# Patient Record
Sex: FEMALE | Race: OTHER | Employment: FULL TIME | URBAN - METROPOLITAN AREA
[De-identification: names, ages, dates, MRNs, and addresses within clinical notes are randomized per-mention and may not be internally consistent; named-entity substitution may affect disease eponyms.]

---

## 2021-03-19 ENCOUNTER — OFFICE VISIT (OUTPATIENT)
Dept: SURGERY | Facility: CLINIC | Age: 46
End: 2021-03-19
Payer: COMMERCIAL

## 2021-03-19 ENCOUNTER — NURSE NAVIGATOR ENCOUNTER (OUTPATIENT)
Dept: HEMATOLOGY/ONCOLOGY | Facility: HOSPITAL | Age: 46
End: 2021-03-19

## 2021-03-19 VITALS
WEIGHT: 144 LBS | SYSTOLIC BLOOD PRESSURE: 162 MMHG | BODY MASS INDEX: 23.14 KG/M2 | OXYGEN SATURATION: 99 % | HEART RATE: 101 BPM | DIASTOLIC BLOOD PRESSURE: 104 MMHG | RESPIRATION RATE: 16 BRPM | HEIGHT: 66 IN

## 2021-03-19 DIAGNOSIS — C50.511 MALIGNANT NEOPLASM OF LOWER-OUTER QUADRANT OF RIGHT BREAST OF FEMALE, ESTROGEN RECEPTOR POSITIVE (HCC): Primary | ICD-10-CM

## 2021-03-19 DIAGNOSIS — Z17.0 MALIGNANT NEOPLASM OF LOWER-OUTER QUADRANT OF RIGHT BREAST OF FEMALE, ESTROGEN RECEPTOR POSITIVE (HCC): Primary | ICD-10-CM

## 2021-03-19 PROCEDURE — 3077F SYST BP >= 140 MM HG: CPT | Performed by: SURGERY

## 2021-03-19 PROCEDURE — 99205 OFFICE O/P NEW HI 60 MIN: CPT | Performed by: SURGERY

## 2021-03-19 PROCEDURE — 3080F DIAST BP >= 90 MM HG: CPT | Performed by: SURGERY

## 2021-03-19 PROCEDURE — 3008F BODY MASS INDEX DOCD: CPT | Performed by: SURGERY

## 2021-03-19 RX ORDER — MULTIVITAMIN
TABLET ORAL
COMMUNITY

## 2021-03-19 NOTE — PATIENT INSTRUCTIONS
Dr. Kathrin Cruz  Tel: [phone]  Fax: [facility]  [address] Pat, [address] [place] [address]  [phone]     Surgery/Procedure: Bilateral nipple sparing mastectomy, right lymphoscintigraphy, right sentinel lymph node biopsy, before to confirm the procedure. They will also give you the time you need to arrive by and directions on where to go. They begin making calls after 2pm, if you are not contacted by 4pm, please call the surgeon's office listed above.   10. Do not take any b

## 2021-03-19 NOTE — PROGRESS NOTES
Breast Surgery New Patient Consultation    This is the first visit for this 41year old woman, referred by Dr. Mary Lou Meng, who presents for evaluation of right breast cancer.     History of Present Illness:   Ms. Maxi Pugh is a 41year old woman Ashkenazi Jehovah's witness ancestry. Social History:    Alcohol use Yes   Comment: Socially         Smoking status: Never Smoker   Smokeless tobacco: Not on file   She is .      Review of Systems:  General:   The patient denies, fever, chills, night sweats, Musculoskeletal:  The patient denies muscle aches/pain, joint pain, stiff joints, neck pain, back pain or bone pain.     Neuropsychiatric:  There is no history of migraines or severe headaches, seizure/epilepsy, speech problems, coordination problems, t areola appear normal. There is no skin dimpling with movement of the pectoralis. There is no nipple retraction. No nipple discharge can be elicited. The parenchyma is mildly nodular.  There are no dominant masses in the breast. The axillary tail is normal. biopsy, the possible need for axillary dissection, and the long-term sequelae of this procedure.  With regard to systemic therapy, final recommendation will be made following receipt of final pathology post-op, but I outlined the possibility of endocrine th

## 2021-03-19 NOTE — PROGRESS NOTES
Met with patient and her , Aldo Kirk in clinic. Introduced myself as the breast navigator nurse and explained the role of the breast nurse navigator and coordination of care.  Explained the role of all of the physicians involved in her care including the

## 2021-03-23 ENCOUNTER — OFFICE VISIT (OUTPATIENT)
Dept: SURGERY | Facility: CLINIC | Age: 46
End: 2021-03-23
Payer: COMMERCIAL

## 2021-03-23 ENCOUNTER — TELEPHONE (OUTPATIENT)
Dept: SURGERY | Facility: CLINIC | Age: 46
End: 2021-03-23

## 2021-03-23 VITALS
WEIGHT: 145 LBS | DIASTOLIC BLOOD PRESSURE: 94 MMHG | BODY MASS INDEX: 24.75 KG/M2 | SYSTOLIC BLOOD PRESSURE: 136 MMHG | HEIGHT: 64.2 IN | OXYGEN SATURATION: 98 % | HEART RATE: 104 BPM | RESPIRATION RATE: 16 BRPM

## 2021-03-23 DIAGNOSIS — C50.511 MALIGNANT NEOPLASM OF LOWER-OUTER QUADRANT OF RIGHT BREAST OF FEMALE, ESTROGEN RECEPTOR POSITIVE (HCC): Primary | ICD-10-CM

## 2021-03-23 DIAGNOSIS — Z17.0 MALIGNANT NEOPLASM OF LOWER-OUTER QUADRANT OF RIGHT BREAST OF FEMALE, ESTROGEN RECEPTOR POSITIVE (HCC): Primary | ICD-10-CM

## 2021-03-23 DIAGNOSIS — C50.911 MALIGNANT NEOPLASM OF RIGHT FEMALE BREAST, UNSPECIFIED ESTROGEN RECEPTOR STATUS, UNSPECIFIED SITE OF BREAST (HCC): Primary | ICD-10-CM

## 2021-03-23 PROCEDURE — 99203 OFFICE O/P NEW LOW 30 MIN: CPT | Performed by: SURGERY

## 2021-03-23 PROCEDURE — 3080F DIAST BP >= 90 MM HG: CPT | Performed by: SURGERY

## 2021-03-23 PROCEDURE — 3008F BODY MASS INDEX DOCD: CPT | Performed by: SURGERY

## 2021-03-23 PROCEDURE — 3075F SYST BP GE 130 - 139MM HG: CPT | Performed by: SURGERY

## 2021-03-23 NOTE — TELEPHONE ENCOUNTER
I called the patient and scheduled her surgery with Dr Renzo Mancera and Dr Christina Law at [facility] on 04/15/2021.  Confirmed date and location

## 2021-03-23 NOTE — CONSULTS
New Patient Consultation    This is the first visit for this 41year old female who presents to discuss reconstructive options following surgery for breast cancer. History of Present Illness:    The patient is a 41year old female who presents with a rig loss, change in vision, double vision, cataracts, glaucoma, nasal congestion, nosebleed, hoarseness, sore throat, or swollen glands. Respiratory:  The patient denies shortness of breath, cough, bloody cough, phlegm, asthma, or wheezing.     Enrique Torres Exam:    BP (!) 136/94 (BP Location: Right arm, Patient Position: Sitting, Cuff Size: adult)   Pulse 104   Resp 16   Ht 1.631 m (5' 4.2\")   Wt 65.8 kg (145 lb)   SpO2 98%   BMI 24.73 kg/m²     The patient is awake, alert, and oriented.   She is well-nouris majority discussion was focused on implant-based reconstruction. Specifically, the nature and technique of tissue expander reconstruction was reviewed with the patient.  We discussed the prepectoral placement of the tissue expander, use of acellular matr

## 2021-03-23 NOTE — PATIENT INSTRUCTIONS
Surgeon:         Dr. Prasad Martinez                                        Tel:        [phone]                                  Fax:        [phone]    Surgery/Procedure:   Immediate bilateral breast reconstruction with tissue expanders, acellular d COVID-19 prior to surgery. If COVID-19 positive history, symptoms included: ___________________________________________.     Dr. Claude Mad to do surgical clearance H&P  CBC, CMP required  Photos and informed consent done 3/23/21

## 2021-03-24 DIAGNOSIS — C50.511 MALIGNANT NEOPLASM OF LOWER-OUTER QUADRANT OF RIGHT BREAST OF FEMALE, ESTROGEN RECEPTOR POSITIVE (HCC): Primary | ICD-10-CM

## 2021-03-24 DIAGNOSIS — Z17.0 MALIGNANT NEOPLASM OF LOWER-OUTER QUADRANT OF RIGHT BREAST OF FEMALE, ESTROGEN RECEPTOR POSITIVE (HCC): Primary | ICD-10-CM

## 2021-03-25 ENCOUNTER — LAB ENCOUNTER (OUTPATIENT)
Dept: LAB | Facility: HOSPITAL | Age: 46
End: 2021-03-25
Attending: SURGERY
Payer: COMMERCIAL

## 2021-03-25 DIAGNOSIS — C50.919 INVASIVE LOBULAR CARCINOMA OF BREAST IN FEMALE (HCC): Primary | ICD-10-CM

## 2021-03-26 PROCEDURE — 88321 CONSLTJ&REPRT SLD PREP ELSWR: CPT

## 2021-03-31 ENCOUNTER — TELEPHONE (OUTPATIENT)
Dept: HEMATOLOGY/ONCOLOGY | Facility: HOSPITAL | Age: 46
End: 2021-03-31

## 2021-03-31 NOTE — TELEPHONE ENCOUNTER
Phoned and introduced myself to patient as Breast RN Navigator at [facility]. Patient is scheduled with Dr. Lidya Garcia and Dr. Carrol Bamberger for Bilateral mastectomies and immediate breast reconstruction 4/15/21.   Patient has expressed her wish to meet with Sariah Munoz

## 2021-04-08 ENCOUNTER — TELEPHONE (OUTPATIENT)
Dept: SURGERY | Facility: CLINIC | Age: 46
End: 2021-04-08

## 2021-04-08 ENCOUNTER — TELEPHONE (OUTPATIENT)
Dept: HEMATOLOGY/ONCOLOGY | Facility: HOSPITAL | Age: 46
End: 2021-04-08

## 2021-04-08 NOTE — TELEPHONE ENCOUNTER
SW received paperwork for the pt's Leave of Absence. Pt is scheduled for bilateral mastectomy by Dr. Abelardo Chacko with immediate reconstruction by Dr. Cruz Mendoza on 04/15/21.  Plan is for 4-6 weeks off work (05/27/21), with the first 2 weeks back at work as limited a

## 2021-04-08 NOTE — TELEPHONE ENCOUNTER
Calling pt to relay results. Informed pt, that per Dr. Esteban Easley, she reviews the MRI and \"there is no change in the surgical plan. \"  Informed pt that she will receive a call on the 14th with the time she needs to arrive by and directions on where to go, a

## 2021-04-13 ENCOUNTER — LAB ENCOUNTER (OUTPATIENT)
Dept: LAB | Age: 46
End: 2021-04-13
Attending: SURGERY
Payer: COMMERCIAL

## 2021-04-13 DIAGNOSIS — Z01.818 PREOPERATIVE TESTING: ICD-10-CM

## 2021-04-14 ENCOUNTER — ANESTHESIA EVENT (OUTPATIENT)
Dept: SURGERY | Facility: HOSPITAL | Age: 46
End: 2021-04-14
Payer: COMMERCIAL

## 2021-04-15 ENCOUNTER — HOSPITAL ENCOUNTER (OUTPATIENT)
Facility: HOSPITAL | Age: 46
Discharge: HOME | End: 2021-04-16
Attending: SURGERY | Admitting: SURGERY
Payer: COMMERCIAL

## 2021-04-15 ENCOUNTER — HOSPITAL ENCOUNTER (OUTPATIENT)
Dept: NUCLEAR MEDICINE | Facility: HOSPITAL | Age: 46
Discharge: HOME | End: 2021-04-15
Attending: SURGERY
Payer: COMMERCIAL

## 2021-04-15 ENCOUNTER — ANESTHESIA (OUTPATIENT)
Dept: SURGERY | Facility: HOSPITAL | Age: 46
End: 2021-04-15
Payer: COMMERCIAL

## 2021-04-15 DIAGNOSIS — C50.511 MALIGNANT NEOPLASM OF LOWER-OUTER QUADRANT OF RIGHT BREAST OF FEMALE, ESTROGEN RECEPTOR POSITIVE (HCC): ICD-10-CM

## 2021-04-15 DIAGNOSIS — Z01.818 PREOPERATIVE TESTING: Primary | ICD-10-CM

## 2021-04-15 DIAGNOSIS — Z17.0 MALIGNANT NEOPLASM OF LOWER-OUTER QUADRANT OF RIGHT BREAST OF FEMALE, ESTROGEN RECEPTOR POSITIVE (HCC): ICD-10-CM

## 2021-04-15 PROCEDURE — 88305 TISSUE EXAM BY PATHOLOGIST: CPT | Performed by: SURGERY

## 2021-04-15 PROCEDURE — 88363 XM ARCHIVE TISSUE MOLEC ANAL: CPT | Performed by: SURGERY

## 2021-04-15 PROCEDURE — 3E0T3BZ INTRODUCTION OF ANESTHETIC AGENT INTO PERIPHERAL NERVES AND PLEXI, PERCUTANEOUS APPROACH: ICD-10-PCS | Performed by: ANESTHESIOLOGY

## 2021-04-15 PROCEDURE — 76942 ECHO GUIDE FOR BIOPSY: CPT | Performed by: SURGERY

## 2021-04-15 PROCEDURE — 88331 PATH CONSLTJ SURG 1 BLK 1SPC: CPT | Performed by: SURGERY

## 2021-04-15 PROCEDURE — 07B50ZX EXCISION OF RIGHT AXILLARY LYMPHATIC, OPEN APPROACH, DIAGNOSTIC: ICD-10-PCS | Performed by: SURGERY

## 2021-04-15 PROCEDURE — 0HTV0ZZ RESECTION OF BILATERAL BREAST, OPEN APPROACH: ICD-10-PCS | Performed by: SURGERY

## 2021-04-15 PROCEDURE — 88307 TISSUE EXAM BY PATHOLOGIST: CPT | Performed by: SURGERY

## 2021-04-15 PROCEDURE — C71N1ZZ PLANAR NUCLEAR MEDICINE IMAGING OF UPPER EXTREMITY LYMPHATICS USING TECHNETIUM 99M (TC-99M): ICD-10-PCS | Performed by: RADIOLOGY

## 2021-04-15 PROCEDURE — 76942 ECHO GUIDE FOR BIOPSY: CPT | Performed by: ANESTHESIOLOGY

## 2021-04-15 PROCEDURE — 88360 TUMOR IMMUNOHISTOCHEM/MANUAL: CPT | Performed by: SURGERY

## 2021-04-15 PROCEDURE — 88334 PATH CONSLTJ SURG CYTO XM EA: CPT | Performed by: SURGERY

## 2021-04-15 PROCEDURE — 64450 NJX AA&/STRD OTHER PN/BRANCH: CPT | Performed by: SURGERY

## 2021-04-15 PROCEDURE — 78195 LYMPH SYSTEM IMAGING: CPT | Performed by: SURGERY

## 2021-04-15 PROCEDURE — 0HHV0NZ INSERTION OF TISSUE EXPANDER INTO BILATERAL BREAST, OPEN APPROACH: ICD-10-PCS | Performed by: SURGERY

## 2021-04-15 PROCEDURE — 3E0W3KZ INTRODUCTION OF OTHER DIAGNOSTIC SUBSTANCE INTO LYMPHATICS, PERCUTANEOUS APPROACH: ICD-10-PCS | Performed by: SURGERY

## 2021-04-15 PROCEDURE — 81025 URINE PREGNANCY TEST: CPT

## 2021-04-15 PROCEDURE — 88300 SURGICAL PATH GROSS: CPT | Performed by: SURGERY

## 2021-04-15 PROCEDURE — 0HBT0ZX EXCISION OF RIGHT BREAST, OPEN APPROACH, DIAGNOSTIC: ICD-10-PCS | Performed by: SURGERY

## 2021-04-15 PROCEDURE — 88342 IMHCHEM/IMCYTCHM 1ST ANTB: CPT | Performed by: SURGERY

## 2021-04-15 PROCEDURE — 64490 INJ PARAVERT F JNT C/T 1 LEV: CPT | Performed by: SURGERY

## 2021-04-15 RX ORDER — ACETAMINOPHEN 500 MG
500 TABLET ORAL EVERY 6 HOURS PRN
Status: DISCONTINUED | OUTPATIENT
Start: 2021-04-15 | End: 2021-04-16

## 2021-04-15 RX ORDER — ACETAMINOPHEN 500 MG
1000 TABLET ORAL EVERY 6 HOURS PRN
Status: DISCONTINUED | OUTPATIENT
Start: 2021-04-15 | End: 2021-04-16

## 2021-04-15 RX ORDER — CEFAZOLIN SODIUM/WATER 2 G/20 ML
2 SYRINGE (ML) INTRAVENOUS EVERY 8 HOURS
Status: COMPLETED | OUTPATIENT
Start: 2021-04-15 | End: 2021-04-16

## 2021-04-15 RX ORDER — ONDANSETRON 4 MG/1
4 TABLET, ORALLY DISINTEGRATING ORAL EVERY 6 HOURS PRN
Status: DISCONTINUED | OUTPATIENT
Start: 2021-04-15 | End: 2021-04-16

## 2021-04-15 RX ORDER — SODIUM CHLORIDE, SODIUM LACTATE, POTASSIUM CHLORIDE, CALCIUM CHLORIDE 600; 310; 30; 20 MG/100ML; MG/100ML; MG/100ML; MG/100ML
INJECTION, SOLUTION INTRAVENOUS CONTINUOUS
Status: DISCONTINUED | OUTPATIENT
Start: 2021-04-15 | End: 2021-04-16

## 2021-04-15 RX ORDER — ENOXAPARIN SODIUM 100 MG/ML
40 INJECTION SUBCUTANEOUS DAILY
Status: DISCONTINUED | OUTPATIENT
Start: 2021-04-16 | End: 2021-04-16

## 2021-04-15 RX ORDER — ACETAMINOPHEN 500 MG
1000 TABLET ORAL ONCE
Status: COMPLETED | OUTPATIENT
Start: 2021-04-15 | End: 2021-04-15

## 2021-04-15 RX ORDER — CEFAZOLIN SODIUM/WATER 2 G/20 ML
SYRINGE (ML) INTRAVENOUS AS NEEDED
Status: DISCONTINUED | OUTPATIENT
Start: 2021-04-15 | End: 2021-04-15 | Stop reason: SURG

## 2021-04-15 RX ORDER — METOCLOPRAMIDE 10 MG/1
10 TABLET ORAL EVERY 6 HOURS PRN
Status: DISCONTINUED | OUTPATIENT
Start: 2021-04-15 | End: 2021-04-16

## 2021-04-15 RX ORDER — MORPHINE SULFATE 2 MG/ML
2 INJECTION, SOLUTION INTRAMUSCULAR; INTRAVENOUS EVERY 2 HOUR PRN
Status: DISCONTINUED | OUTPATIENT
Start: 2021-04-15 | End: 2021-04-15

## 2021-04-15 RX ORDER — DIPHENHYDRAMINE HCL 25 MG
25 CAPSULE ORAL EVERY 4 HOURS PRN
Status: DISCONTINUED | OUTPATIENT
Start: 2021-04-15 | End: 2021-04-16

## 2021-04-15 RX ORDER — DOCUSATE SODIUM 100 MG/1
100 CAPSULE, LIQUID FILLED ORAL 2 TIMES DAILY
Status: DISCONTINUED | OUTPATIENT
Start: 2021-04-16 | End: 2021-04-16

## 2021-04-15 RX ORDER — ONDANSETRON 4 MG/1
4 TABLET, FILM COATED ORAL EVERY 8 HOURS PRN
Qty: 30 TABLET | Refills: 0 | Status: SHIPPED | OUTPATIENT
Start: 2021-04-15 | End: 2021-04-23 | Stop reason: ALTCHOICE

## 2021-04-15 RX ORDER — OXYCODONE HYDROCHLORIDE 5 MG/1
10 TABLET ORAL EVERY 4 HOURS PRN
Status: DISCONTINUED | OUTPATIENT
Start: 2021-04-15 | End: 2021-04-15

## 2021-04-15 RX ORDER — MIDAZOLAM HYDROCHLORIDE 1 MG/ML
INJECTION INTRAMUSCULAR; INTRAVENOUS
Status: DISCONTINUED | OUTPATIENT
Start: 2021-04-15 | End: 2021-04-15 | Stop reason: SURG

## 2021-04-15 RX ORDER — MORPHINE SULFATE 15 MG/1
15 TABLET ORAL EVERY 4 HOURS PRN
Status: DISCONTINUED | OUTPATIENT
Start: 2021-04-15 | End: 2021-04-15

## 2021-04-15 RX ORDER — MORPHINE SULFATE 4 MG/ML
4 INJECTION, SOLUTION INTRAMUSCULAR; INTRAVENOUS EVERY 2 HOUR PRN
Status: DISCONTINUED | OUTPATIENT
Start: 2021-04-15 | End: 2021-04-15

## 2021-04-15 RX ORDER — ACETAMINOPHEN 500 MG
1000 TABLET ORAL EVERY 8 HOURS
Status: DISCONTINUED | OUTPATIENT
Start: 2021-04-15 | End: 2021-04-15

## 2021-04-15 RX ORDER — CEFAZOLIN SODIUM/WATER 2 G/20 ML
2 SYRINGE (ML) INTRAVENOUS ONCE
Status: DISCONTINUED | OUTPATIENT
Start: 2021-04-15 | End: 2021-04-15 | Stop reason: HOSPADM

## 2021-04-15 RX ORDER — OXYCODONE HYDROCHLORIDE 5 MG/1
5 TABLET ORAL EVERY 4 HOURS PRN
Status: DISCONTINUED | OUTPATIENT
Start: 2021-04-15 | End: 2021-04-15

## 2021-04-15 RX ORDER — LIDOCAINE HYDROCHLORIDE 10 MG/ML
INJECTION, SOLUTION INFILTRATION; PERINEURAL
Status: DISCONTINUED | OUTPATIENT
Start: 2021-04-15 | End: 2021-04-15 | Stop reason: SURG

## 2021-04-15 RX ORDER — MORPHINE SULFATE 2 MG/ML
2 INJECTION, SOLUTION INTRAMUSCULAR; INTRAVENOUS
Status: DISCONTINUED | OUTPATIENT
Start: 2021-04-15 | End: 2021-04-16

## 2021-04-15 RX ORDER — DIPHENHYDRAMINE HYDROCHLORIDE 50 MG/ML
12.5 INJECTION INTRAMUSCULAR; INTRAVENOUS EVERY 4 HOURS PRN
Status: DISCONTINUED | OUTPATIENT
Start: 2021-04-15 | End: 2021-04-16

## 2021-04-15 RX ORDER — LIDOCAINE HYDROCHLORIDE 10 MG/ML
INJECTION, SOLUTION EPIDURAL; INFILTRATION; INTRACAUDAL; PERINEURAL AS NEEDED
Status: DISCONTINUED | OUTPATIENT
Start: 2021-04-15 | End: 2021-04-15 | Stop reason: SURG

## 2021-04-15 RX ORDER — HYDROCODONE BITARTRATE AND ACETAMINOPHEN 5; 325 MG/1; MG/1
1-2 TABLET ORAL EVERY 4 HOURS PRN
Qty: 40 TABLET | Refills: 0 | Status: SHIPPED | OUTPATIENT
Start: 2021-04-15 | End: 2021-04-23 | Stop reason: ALTCHOICE

## 2021-04-15 RX ORDER — CEPHALEXIN 500 MG/1
500 CAPSULE ORAL 4 TIMES DAILY
Qty: 40 CAPSULE | Refills: 2 | Status: SHIPPED | OUTPATIENT
Start: 2021-04-15 | End: 2021-05-04

## 2021-04-15 RX ORDER — ROPIVACAINE HYDROCHLORIDE 5 MG/ML
INJECTION, SOLUTION EPIDURAL; INFILTRATION; PERINEURAL
Status: DISCONTINUED | OUTPATIENT
Start: 2021-04-15 | End: 2021-04-15 | Stop reason: SURG

## 2021-04-15 RX ORDER — MORPHINE SULFATE 4 MG/ML
6 INJECTION, SOLUTION INTRAMUSCULAR; INTRAVENOUS EVERY 2 HOUR PRN
Status: DISCONTINUED | OUTPATIENT
Start: 2021-04-15 | End: 2021-04-15

## 2021-04-15 RX ORDER — HYDROCODONE BITARTRATE AND ACETAMINOPHEN 5; 325 MG/1; MG/1
2 TABLET ORAL EVERY 4 HOURS PRN
Status: DISCONTINUED | OUTPATIENT
Start: 2021-04-15 | End: 2021-04-16

## 2021-04-15 RX ORDER — ONDANSETRON 2 MG/ML
4 INJECTION INTRAMUSCULAR; INTRAVENOUS EVERY 6 HOURS PRN
Status: DISCONTINUED | OUTPATIENT
Start: 2021-04-15 | End: 2021-04-16

## 2021-04-15 RX ORDER — METOCLOPRAMIDE HYDROCHLORIDE 5 MG/ML
10 INJECTION INTRAMUSCULAR; INTRAVENOUS EVERY 6 HOURS PRN
Status: DISCONTINUED | OUTPATIENT
Start: 2021-04-15 | End: 2021-04-16

## 2021-04-15 RX ORDER — DEXAMETHASONE SODIUM PHOSPHATE 4 MG/ML
VIAL (ML) INJECTION AS NEEDED
Status: DISCONTINUED | OUTPATIENT
Start: 2021-04-15 | End: 2021-04-15 | Stop reason: SURG

## 2021-04-15 RX ORDER — MORPHINE SULFATE 4 MG/ML
4 INJECTION, SOLUTION INTRAMUSCULAR; INTRAVENOUS
Status: DISCONTINUED | OUTPATIENT
Start: 2021-04-15 | End: 2021-04-16

## 2021-04-15 RX ORDER — MORPHINE SULFATE 4 MG/ML
8 INJECTION, SOLUTION INTRAMUSCULAR; INTRAVENOUS
Status: DISCONTINUED | OUTPATIENT
Start: 2021-04-15 | End: 2021-04-16

## 2021-04-15 RX ORDER — HYDROCODONE BITARTRATE AND ACETAMINOPHEN 5; 325 MG/1; MG/1
1 TABLET ORAL EVERY 4 HOURS PRN
Status: DISCONTINUED | OUTPATIENT
Start: 2021-04-15 | End: 2021-04-16

## 2021-04-15 RX ORDER — DOCUSATE SODIUM 100 MG/1
100 CAPSULE, LIQUID FILLED ORAL 2 TIMES DAILY
Qty: 40 CAPSULE | Refills: 0 | Status: SHIPPED | OUTPATIENT
Start: 2021-04-15 | End: 2021-04-23 | Stop reason: ALTCHOICE

## 2021-04-15 RX ORDER — METHYLENE BLUE 10 MG/ML
INJECTION INTRAVENOUS AS NEEDED
Status: DISCONTINUED | OUTPATIENT
Start: 2021-04-15 | End: 2021-04-15 | Stop reason: HOSPADM

## 2021-04-15 RX ADMIN — MIDAZOLAM HYDROCHLORIDE 2 MG: 1 INJECTION INTRAMUSCULAR; INTRAVENOUS at 13:25:00

## 2021-04-15 RX ADMIN — LIDOCAINE HYDROCHLORIDE 5 ML: 10 INJECTION, SOLUTION INFILTRATION; PERINEURAL at 13:25:00

## 2021-04-15 RX ADMIN — LIDOCAINE HYDROCHLORIDE 50 MG: 10 INJECTION, SOLUTION EPIDURAL; INFILTRATION; INTRACAUDAL; PERINEURAL at 14:06:00

## 2021-04-15 RX ADMIN — LIDOCAINE HYDROCHLORIDE 50 MG: 10 INJECTION, SOLUTION EPIDURAL; INFILTRATION; INTRACAUDAL; PERINEURAL at 13:35:00

## 2021-04-15 RX ADMIN — ROPIVACAINE HYDROCHLORIDE 40 ML: 5 INJECTION, SOLUTION EPIDURAL; INFILTRATION; PERINEURAL at 13:25:00

## 2021-04-15 RX ADMIN — SODIUM CHLORIDE, SODIUM LACTATE, POTASSIUM CHLORIDE, CALCIUM CHLORIDE: 600; 310; 30; 20 INJECTION, SOLUTION INTRAVENOUS at 13:30:00

## 2021-04-15 RX ADMIN — SODIUM CHLORIDE, SODIUM LACTATE, POTASSIUM CHLORIDE, CALCIUM CHLORIDE: 600; 310; 30; 20 INJECTION, SOLUTION INTRAVENOUS at 14:44:00

## 2021-04-15 RX ADMIN — CEFAZOLIN SODIUM/WATER 2 G: 2 G/20 ML SYRINGE (ML) INTRAVENOUS at 13:42:00

## 2021-04-15 RX ADMIN — DEXAMETHASONE SODIUM PHOSPHATE 4 MG: 4 MG/ML VIAL (ML) INJECTION at 13:35:00

## 2021-04-15 NOTE — ANESTHESIA POSTPROCEDURE EVALUATION
Patient: Anny Berry    Procedure Summary     Date: 04/15/21 Room / Location: [address] [place] [address] MAIN OR 03 / [address] [place] [address] MAIN OR    Anesthesia Start: 9838 Anesthesia Stop: 1369    Procedures:       Bilateral nipple sparing mastectomy, right lymphoscintigraphy, r

## 2021-04-15 NOTE — ANESTHESIA PROCEDURE NOTES
Peripheral Block    Date/Time: 4/15/2021 1:25 PM  Performed by: Adri Laughlin MD  Authorized by: Adri Laughlin MD       General Information and Staff    Start Time:  4/15/2021 1:06 PM  End Time:  4/15/2021 1:22 PM  Anesthesiologist:  Adri Laughlin MD

## 2021-04-15 NOTE — ANESTHESIA PROCEDURE NOTES
Airway  Urgency: Elective    Airway not difficult    General Information and Staff    Patient location during procedure: OR  Anesthesiologist: Paola Rodríguez MD  Resident/CRNA: Paresh Garcia CRNA  Performed: anesthesiologist and CRNA     [facility]

## 2021-04-15 NOTE — BRIEF OP NOTE
Pre-Operative Diagnosis: Malignant neoplasm of lower-outer quadrant of right breast of female, estrogen receptor positive [facility] [C50.511, Z17.0]     Post-Operative Diagnosis: Malignant neoplasm of lower-outer quadrant of right breast of female, estrogen re

## 2021-04-15 NOTE — ANESTHESIA PREPROCEDURE EVALUATION
Anesthesia PreOp Note    HPI:     Cassandra Eisenmenger is a 41year old female who presents for preoperative consultation requested by: Sherill Peabody, MD    Date of Surgery: 4/15/2021    Procedure(s):  Bilateral nipple sparing mastectomy, right children: Not on file      Years of education: Not on file      Highest education level: Not on file    Occupational History      Not on file    Tobacco Use      Smoking status: Never Smoker      Smokeless tobacco: Never Used    Vaping Use      Vaping Use: her pulse is 90. Her respiration is 18 and oxygen saturation is 100%. 04/13/21  1153 04/15/21  1023   BP:  144/90   Pulse:  90   Resp:  18   Temp:  97.8 °F (36.6 °C)   TempSrc:  Oral   SpO2:  100%   Weight: 66.2 kg (146 lb) 63.5 kg (140 lb)   Height: 1.

## 2021-04-15 NOTE — OPERATIVE REPORT
PREOPERATIVE DIAGNOSIS: Right breast cancer with acquired absence of bilateral breasts. POSTOPERATIVE DIAGNOSIS: Right breast cancer with acquired absence of bilateral breasts.   PROCEDURE PERFORMED: Immediate bilateral breast reconstruction with tissue ex thickness of viability to facilitate immediate reconstruction. The pockets were irrigated with warm saline irrigation until all particulate fat was evacuated. Hemostasis was then secured with electrocautery.   Next, the pockets were irrigated with Betadine running 4-0 Monocryl subcuticular suture. The drain sites were dressed with BioPatch and Tegaderm. The incisions dressed with Dermabond, steristrips, Fluff gauze, and a surgical bra.  The patient was awakened, extubated, and taken to the recovery area i

## 2021-04-15 NOTE — ANESTHESIA POSTPROCEDURE EVALUATION
Patient: Ambrose Concepcion    Procedure Summary     Date: 04/15/21 Room / Location: [facility] OR 03 / [facility] OR    Anesthesia Start: 6081 Anesthesia Stop:     Procedures:       Bilateral nipple sparing mastectomy, right lymphoscintigraphy, right

## 2021-04-15 NOTE — PROGRESS NOTES
Plan for bilateral NS-mastectomy by Dr. Lisa Yang and immediate reconstruction with tissue expander and acellular dermal matrix reviewed with patient.  The risks of surgery including but not limited to bleeding, infection, scarring, delayed wound healing, sero

## 2021-04-15 NOTE — PROGRESS NOTES
Patient awake and alert upon arrival from PACU. Surgical dressings and surgical bra in place. 2 JESSA's in place to R breast, one JESSA in place to L breast. Denies pain. Neuro check complete,  and sensation within normal limits.  Denies nausea, states she is

## 2021-04-16 VITALS
HEART RATE: 98 BPM | TEMPERATURE: 98 F | WEIGHT: 140 LBS | BODY MASS INDEX: 22.5 KG/M2 | SYSTOLIC BLOOD PRESSURE: 122 MMHG | RESPIRATION RATE: 16 BRPM | OXYGEN SATURATION: 97 % | DIASTOLIC BLOOD PRESSURE: 72 MMHG | HEIGHT: 66 IN

## 2021-04-16 NOTE — PLAN OF CARE
Patient is sleeping in bed, vitals are stable and checked q4hrs. A&O x4 and denies pain or nausea. Neuro checks continue to be done q4hrs, patient is not having any numbness or tingling in her hands and moves with full strength. Tolerating general diet.  Vo

## 2021-04-16 NOTE — PLAN OF CARE
Patient has been cleared for discharge by surgical team. IV removed. Morning dose of lovenox administered. Dressing intact. Tegaderm and biopatch for home given. JESSA care teaching done, including recording drain output.  Discharge instructions reviewed and p

## 2021-04-16 NOTE — PROGRESS NOTES
Plastic Surgery Progress Note    Marina Palacios is a 41year old female POD#1 s/p bilateral nipple sparing mastectomy (Dr. Pollo Clemens) and immediate bilateral breast reconstruction with prepectoral tissue expander placement, alloderm ADM, no intra appointment.     Josephine Serrano [place]  4/16/2021  10:16 AM

## 2021-04-20 NOTE — OPERATIVE REPORT
[facility]    PATIENT'S NAME: Alia Demetrius   ATTENDING PHYSICIAN: Annette Farley. Tee Cameron MD   OPERATING PHYSICIAN: Annette Farley.  Tee Cameron MD   PATIENT ACCOUNT#:   [id number]    LOCATION:  [address] [place] [address] #:   [id number] the nuclear medicine department where she underwent injection of radioisotope as well as lymphoscintigraphy for sentinel lymph node identification preoperatively in the right breast.  She was brought to the OR, placed in supine position.   She was properly approach. A short stitch was placed on the mastectomy specimen at the site of this biopsy. The breast was then dissected off the underlying muscle with electrocautery including en bloc excision of the pectoralis fascia.   It was further oriented with a lo

## 2021-04-20 NOTE — H&P
History of Present Illness:   Ms. Anny Lim is a 41year old woman who presents with imaging detected breast cancer. The patient denies any palpable masses, nipple discharge, skin changes or axillary symptoms.   She has no personal prior h Not on file   She is .      Review of Systems:  General:   The patient denies, fever, chills, night sweats, fatigue, generalized weakness, change in appetite or weight loss.     HEENT:     The patient denies eye irritation, cataracts, redness, glauc is no history of migraines or severe headaches, seizure/epilepsy, speech problems, coordination problems, trembling/tremors, fainting/black outs, dizziness, memory problems, loss of sensation/numbness, problems walking, weakness, tingling or burning in [facility] elicited. The parenchyma is mildly nodular. There are no dominant masses in the breast. The axillary tail is normal.     Abdomen: The abdomen is soft, flat and non tender. The liver is not enlarged. There are no palpable masses.     Lymph Nodes:   The supr recommendation will be made following receipt of final pathology post-op, but I outlined the possibility of endocrine therapy, chemotherapy, and herceptin, depending on tumor marker profile.     Following this discussion, where all of the patient's questio procedure. We will proceed with procedure as planned.

## 2021-04-23 ENCOUNTER — OFFICE VISIT (OUTPATIENT)
Dept: SURGERY | Facility: CLINIC | Age: 46
End: 2021-04-23
Payer: COMMERCIAL

## 2021-04-23 DIAGNOSIS — Z90.13 ABSENCE OF BOTH BREASTS: Primary | ICD-10-CM

## 2021-04-23 PROCEDURE — 99024 POSTOP FOLLOW-UP VISIT: CPT | Performed by: PHYSICIAN ASSISTANT

## 2021-04-25 PROBLEM — Z90.13 ABSENCE OF BOTH BREASTS: Status: ACTIVE | Noted: 2021-04-25

## 2021-04-26 ENCOUNTER — OFFICE VISIT (OUTPATIENT)
Dept: HEMATOLOGY/ONCOLOGY | Facility: HOSPITAL | Age: 46
End: 2021-04-26
Attending: INTERNAL MEDICINE
Payer: COMMERCIAL

## 2021-04-26 ENCOUNTER — NURSE NAVIGATOR ENCOUNTER (OUTPATIENT)
Dept: HEMATOLOGY/ONCOLOGY | Facility: HOSPITAL | Age: 46
End: 2021-04-26

## 2021-04-26 ENCOUNTER — OFFICE VISIT (OUTPATIENT)
Dept: SURGERY | Facility: CLINIC | Age: 46
End: 2021-04-26
Payer: COMMERCIAL

## 2021-04-26 VITALS
SYSTOLIC BLOOD PRESSURE: 125 MMHG | RESPIRATION RATE: 20 BRPM | BODY MASS INDEX: 23.41 KG/M2 | HEART RATE: 89 BPM | DIASTOLIC BLOOD PRESSURE: 85 MMHG | HEIGHT: 66 IN | OXYGEN SATURATION: 100 % | TEMPERATURE: 98 F | WEIGHT: 145.63 LBS

## 2021-04-26 VITALS
WEIGHT: 145 LBS | BODY MASS INDEX: 23.3 KG/M2 | DIASTOLIC BLOOD PRESSURE: 80 MMHG | HEART RATE: 93 BPM | OXYGEN SATURATION: 100 % | RESPIRATION RATE: 16 BRPM | SYSTOLIC BLOOD PRESSURE: 138 MMHG | HEIGHT: 66 IN | TEMPERATURE: 98 F

## 2021-04-26 DIAGNOSIS — Z17.0 MALIGNANT NEOPLASM OF UPPER-OUTER QUADRANT OF RIGHT BREAST IN FEMALE, ESTROGEN RECEPTOR POSITIVE (HCC): ICD-10-CM

## 2021-04-26 DIAGNOSIS — C50.411 MALIGNANT NEOPLASM OF UPPER-OUTER QUADRANT OF RIGHT BREAST IN FEMALE, ESTROGEN RECEPTOR POSITIVE (HCC): ICD-10-CM

## 2021-04-26 DIAGNOSIS — Z90.13 ABSENCE OF BOTH BREASTS: ICD-10-CM

## 2021-04-26 DIAGNOSIS — Z90.13 ABSENCE OF BOTH BREASTS: Primary | ICD-10-CM

## 2021-04-26 DIAGNOSIS — C50.911 MALIGNANT NEOPLASM OF RIGHT FEMALE BREAST, UNSPECIFIED ESTROGEN RECEPTOR STATUS, UNSPECIFIED SITE OF BREAST (HCC): Primary | ICD-10-CM

## 2021-04-26 PROCEDURE — 3008F BODY MASS INDEX DOCD: CPT | Performed by: PHYSICIAN ASSISTANT

## 2021-04-26 PROCEDURE — 99024 POSTOP FOLLOW-UP VISIT: CPT | Performed by: PHYSICIAN ASSISTANT

## 2021-04-26 PROCEDURE — 3079F DIAST BP 80-89 MM HG: CPT | Performed by: PHYSICIAN ASSISTANT

## 2021-04-26 PROCEDURE — 99205 OFFICE O/P NEW HI 60 MIN: CPT | Performed by: INTERNAL MEDICINE

## 2021-04-26 PROCEDURE — 3074F SYST BP LT 130 MM HG: CPT | Performed by: PHYSICIAN ASSISTANT

## 2021-04-26 NOTE — PROGRESS NOTES
Breast Surgery Post-Operative Visit    Diagnosis: Right breast cancer status post bilateral nipple sparing mastectomy, right sentinel lymph node biopsy, with immediate reconstruction with tissue expanders on 4/15/21    Stage: Cancer Staging  No matching st of oral contraceptive use for 2 years, last in 2002. She denies infertility treatment to achieve pregnancy. Medications:    FISH OIL-KRILL OIL OR, Take 2,000 Units by mouth daily.   , Disp: , Rfl:   Multiple Vitamin (MULTI-VITAMIN DAILY) Oral Tab, Take dark or bloody stools, constipation, yellowing of the skin, indigestion, nausea, change in bowel habits, diarrhea, abdominal pain or vomiting blood.      Genitourinary:  The patient denies frequent urination, needing to get up at night to urinate, urinary h palpable masses/nodules. There are no palpable masses. The trachea is in the midline. Conjunctiva are clear, non-icteric. Breasts:  Breasts are surgically absent with tissue expanders in place. Drain sites are clean, dry, and intact.  Drains are serosang

## 2021-04-26 NOTE — CONSULTS
Cancer Center History and Physical    Patient Name: Sangeetha Chanel   YOB: 1975   Medical Record Number: [id number]   CSN: [id number]   Attending Physician:  Elaine Guerrero MD       Date of Visit: 4/26/2021     Chief Complaint:  [employer] handicapped children. Current Medications:    Current Outpatient Medications:   •  FISH OIL-KRILL OIL OR, Take 2,000 Units by mouth daily.   , Disp: , Rfl:   •  Multiple Vitamin (MULTI-VITAMIN DAILY) Oral Tab, Take by mouth., Disp: , Rfl:     Allergies: Given her  family history of breast cancer, genetic counseling is recommended and referral provided. Adjuvant treatment was discussed briefly with the patient including chemotherapy and anti-hormone therapy.  We discussed the need to address chemothera

## 2021-04-26 NOTE — PROGRESS NOTES
Daniela Mcmillan is a 41year old female who presents today for a follow-up s/p bilateral nipple sparing mastectomy with right breast injection of blue dye for sentinel lymph node identification and right SLNB (Dr. Bob Dear) and immediate bilatera removal and she will contact us when her drains are ready for removal. She will follow up with Dr. Wali Hallman in 2 weeks or sooner if she develops any concerning signs or symptoms. Questions were answered. Patient understands.      Alejandra Garcia  4/2

## 2021-04-30 ENCOUNTER — NURSE ONLY (OUTPATIENT)
Dept: SURGERY | Facility: CLINIC | Age: 46
End: 2021-04-30
Payer: COMMERCIAL

## 2021-04-30 NOTE — PROGRESS NOTES
The patient presents today for drain removal.  Per patient's written record, right breast drain was below 30cc for two consecutive days. Right breast drain was removed due to low output and the patient tolerated this well.  Neosporin and gauze dressing

## 2021-05-04 ENCOUNTER — OFFICE VISIT (OUTPATIENT)
Dept: HEMATOLOGY/ONCOLOGY | Facility: HOSPITAL | Age: 46
End: 2021-05-04
Attending: INTERNAL MEDICINE
Payer: COMMERCIAL

## 2021-05-04 VITALS
DIASTOLIC BLOOD PRESSURE: 77 MMHG | OXYGEN SATURATION: 99 % | RESPIRATION RATE: 16 BRPM | WEIGHT: 148 LBS | HEIGHT: 66 IN | TEMPERATURE: 98 F | SYSTOLIC BLOOD PRESSURE: 142 MMHG | HEART RATE: 87 BPM | BODY MASS INDEX: 23.78 KG/M2

## 2021-05-04 DIAGNOSIS — C50.411 MALIGNANT NEOPLASM OF UPPER-OUTER QUADRANT OF RIGHT BREAST IN FEMALE, ESTROGEN RECEPTOR POSITIVE (HCC): Primary | ICD-10-CM

## 2021-05-04 DIAGNOSIS — Z17.0 MALIGNANT NEOPLASM OF UPPER-OUTER QUADRANT OF RIGHT BREAST IN FEMALE, ESTROGEN RECEPTOR POSITIVE (HCC): Primary | ICD-10-CM

## 2021-05-04 PROCEDURE — 99214 OFFICE O/P EST MOD 30 MIN: CPT | Performed by: INTERNAL MEDICINE

## 2021-05-04 RX ORDER — TAMOXIFEN CITRATE 20 MG/1
20 TABLET ORAL DAILY
Qty: 30 TABLET | Refills: 3 | Status: SHIPPED | OUTPATIENT
Start: 2021-05-04 | End: 2021-08-03

## 2021-05-04 NOTE — PROGRESS NOTES
Breast Surgery Post-Operative Visit    Diagnosis: Right breast cancer status post bilateral nipple sparing mastectomy, right sentinel lymph node biopsy, with immediate reconstruction with tissue expanders on 4/15/21    Stage: Cancer Staging  Malignant neop MASTECTOMY RIGHT         Gynecological History:  Pt is a   She achieved menarche at age 16 and LMP 22  She denies any history of hormone replacement therapy  . She has history of oral contraceptive use for 2 years, last in .   She denies infer walking.     Breasts:  See history of present illness    Gastrointestinal:     There is no history of difficulty or pain with swallowing, reflux symptoms, vomiting, dark or bloody stools, constipation, yellowing of the skin, indigestion, nausea, change in b patterns and movements are normal. Her affect is appropriate. HEENT: The head is normocephalic. The neck is supple. The thyroid is not enlarged and is without palpable masses/nodules. There are no palpable masses. The trachea is in the midline.  Conjunct identifies any limitations or restrictions. She was given ample opportunity for questions and those questions were answered to her satisfaction.  She was encouraged to contact the office with any questions or concerns prior to her next scheduled appointment

## 2021-05-04 NOTE — PROGRESS NOTES
Cancer Center History and Physical    Patient Name: Benjamin Monzon   YOB: 1975   Medical Record Number: [id number]   CSN: [id number]   Attending Physician:  Melissa Lewis MD       Date of Visit: 4/26/2021     Chief Complaint:  [address] Left arm, Patient Position: Sitting, Cuff Size: adult)   Pulse 87   Temp 97.9 °F (36.6 °C) (Oral)   Resp 16   Ht 1.676 m (5' 6\")   Wt 67.1 kg (148 lb)   LMP 03/19/2021   SpO2 99%   BMI 23.89 kg/m²     Physical Examination:  Performance Status:  General: P

## 2021-05-05 ENCOUNTER — OFFICE VISIT (OUTPATIENT)
Dept: SURGERY | Facility: CLINIC | Age: 46
End: 2021-05-05
Payer: COMMERCIAL

## 2021-05-05 VITALS
SYSTOLIC BLOOD PRESSURE: 138 MMHG | RESPIRATION RATE: 18 BRPM | HEART RATE: 91 BPM | TEMPERATURE: 98 F | OXYGEN SATURATION: 99 % | BODY MASS INDEX: 23.78 KG/M2 | HEIGHT: 66 IN | DIASTOLIC BLOOD PRESSURE: 83 MMHG | WEIGHT: 148 LBS

## 2021-05-05 DIAGNOSIS — C50.911 MALIGNANT NEOPLASM OF RIGHT FEMALE BREAST, UNSPECIFIED ESTROGEN RECEPTOR STATUS, UNSPECIFIED SITE OF BREAST (HCC): Primary | ICD-10-CM

## 2021-05-05 PROCEDURE — 99024 POSTOP FOLLOW-UP VISIT: CPT | Performed by: SURGERY

## 2021-05-05 PROCEDURE — 3075F SYST BP GE 130 - 139MM HG: CPT | Performed by: SURGERY

## 2021-05-05 PROCEDURE — 3008F BODY MASS INDEX DOCD: CPT | Performed by: SURGERY

## 2021-05-05 PROCEDURE — 3079F DIAST BP 80-89 MM HG: CPT | Performed by: SURGERY

## 2021-05-07 ENCOUNTER — OFFICE VISIT (OUTPATIENT)
Dept: SURGERY | Facility: CLINIC | Age: 46
End: 2021-05-07
Payer: COMMERCIAL

## 2021-05-07 DIAGNOSIS — Z90.13 ABSENCE OF BOTH BREASTS: Primary | ICD-10-CM

## 2021-05-07 PROCEDURE — 99024 POSTOP FOLLOW-UP VISIT: CPT | Performed by: SURGERY

## 2021-05-07 NOTE — PROGRESS NOTES
Cody Torres is a 41year old female who presents today for a follow-up s/p bilateral nipple sparing mastectomy with right breast injection of blue dye for sentinel lymph node identification and right SLNB (Dr. Logan Riddle) and immediate bilatera discontinue her oral antibiotic. She will follow up in 1 week for continued expansion. She was instructed to call with any questions or concerns. Patient was seen and evaluated with Dr. Sheng Murry. Questions were answered. Patient understands.      [facility]

## 2021-05-11 ENCOUNTER — TELEPHONE (OUTPATIENT)
Dept: HEMATOLOGY/ONCOLOGY | Facility: HOSPITAL | Age: 46
End: 2021-05-11

## 2021-05-11 NOTE — TELEPHONE ENCOUNTER
SW received inquiry from the pt about returning to work. Pt's Leave of Absence was written for up to 6 weeks off, ending 05/27/21.  Pt states she has her next appt with the Plastics PA/Arelis on Fri 05/14, and is hopeful to be cleared medically to return

## 2021-05-14 ENCOUNTER — OFFICE VISIT (OUTPATIENT)
Dept: SURGERY | Facility: CLINIC | Age: 46
End: 2021-05-14
Payer: COMMERCIAL

## 2021-05-14 DIAGNOSIS — Z90.13 ABSENCE OF BOTH BREASTS: Primary | ICD-10-CM

## 2021-05-14 PROCEDURE — 99024 POSTOP FOLLOW-UP VISIT: CPT | Performed by: PHYSICIAN ASSISTANT

## 2021-05-14 NOTE — PROGRESS NOTES
Shonna Cuadra is a 41year old female who presents today for a follow-up s/p bilateral nipple sparing mastectomy with right breast injection of blue dye for sentinel lymph node identification and right SLNB (Dr. 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 She will follow-up next week for continued expansion. She was instructed to call with any questions or concerns. Questions were answered. Patient understands.      Alejandra Miranda  5/14/2021  1

## 2021-05-21 ENCOUNTER — NURSE ONLY (OUTPATIENT)
Dept: SURGERY | Facility: CLINIC | Age: 46
End: 2021-05-21
Payer: COMMERCIAL

## 2021-05-21 NOTE — PROGRESS NOTES
The patient came in today for a nurse visit for bilateral breast tissue expansion. Following protocol,60 cc NS was steriley placed in the bilateral expanders for a total of 370 cc. The patient tolerated the procedure well.   No seroma was encountered    S

## 2021-05-25 ENCOUNTER — APPOINTMENT (OUTPATIENT)
Dept: HEMATOLOGY/ONCOLOGY | Facility: HOSPITAL | Age: 46
End: 2021-05-25
Attending: INTERNAL MEDICINE
Payer: COMMERCIAL

## 2021-05-28 ENCOUNTER — OFFICE VISIT (OUTPATIENT)
Dept: SURGERY | Facility: CLINIC | Age: 46
End: 2021-05-28
Payer: COMMERCIAL

## 2021-05-28 DIAGNOSIS — Z90.13 ABSENCE OF BOTH BREASTS: Primary | ICD-10-CM

## 2021-05-28 PROCEDURE — 99024 POSTOP FOLLOW-UP VISIT: CPT | Performed by: SURGERY

## 2021-05-28 NOTE — PROGRESS NOTES
Ambrose Concepcion is a 41year old female who presents today for a follow-up. Physical Examination:  Breasts: Bilateral breast incisions are clean dry and intact.   Procedure: Bilateral tissue expander sterilely accessed and filled with 60 cc

## 2021-06-04 ENCOUNTER — NURSE ONLY (OUTPATIENT)
Dept: SURGERY | Facility: CLINIC | Age: 46
End: 2021-06-04
Payer: COMMERCIAL

## 2021-06-04 NOTE — PROGRESS NOTES
The patient came in today for a nurse visit for bilateral breast tissue expansion. Following protocol, 60 cc NS was steriley placed in the bilateral expanders for a total of 490 cc. No seroma encountered. The patient tolerated the procedure well.     She

## 2021-06-08 ENCOUNTER — TELEPHONE (OUTPATIENT)
Dept: HEMATOLOGY/ONCOLOGY | Facility: HOSPITAL | Age: 46
End: 2021-06-08

## 2021-06-25 ENCOUNTER — OFFICE VISIT (OUTPATIENT)
Dept: SURGERY | Facility: CLINIC | Age: 46
End: 2021-06-25
Payer: COMMERCIAL

## 2021-06-25 DIAGNOSIS — Z90.13 ABSENCE OF BOTH BREASTS: Primary | ICD-10-CM

## 2021-06-25 PROCEDURE — 99024 POSTOP FOLLOW-UP VISIT: CPT | Performed by: SURGERY

## 2021-06-25 NOTE — PROGRESS NOTES
Mony Silva is a 41year old female who presents today for a follow-up. She wishes to undergo 1 additional expansion. Physical Examination:  Breasts: Bilateral breast incisions are well-healed. There is no erythema or seroma noted.   A

## 2021-06-25 NOTE — PATIENT INSTRUCTIONS
Surgeon:         Dr. Vidya Langley                                        Tel:         [phone]                                  Fax:        [phone]    Surgery/Procedure:  Removal of the bilateral breast tissue expanders and placement of permanent

## 2021-07-13 ENCOUNTER — TELEPHONE (OUTPATIENT)
Dept: SURGERY | Facility: CLINIC | Age: 46
End: 2021-07-13

## 2021-07-13 DIAGNOSIS — Z90.13 ABSENCE OF BOTH BREASTS: Primary | ICD-10-CM

## 2021-07-13 NOTE — TELEPHONE ENCOUNTER
Calling pt in regards to scheduling surgery. Informed pt that I have 09/16/21 available at [facility] with Dr. Jason Spencer. Reminded pt she will need to see her PCP within 30 days of surgery as well as blood work. Pt verbalized understanding.   All ques

## 2021-08-03 ENCOUNTER — OFFICE VISIT (OUTPATIENT)
Dept: HEMATOLOGY/ONCOLOGY | Facility: HOSPITAL | Age: 46
End: 2021-08-03
Attending: INTERNAL MEDICINE
Payer: COMMERCIAL

## 2021-08-03 VITALS
WEIGHT: 143.38 LBS | HEART RATE: 79 BPM | RESPIRATION RATE: 16 BRPM | DIASTOLIC BLOOD PRESSURE: 89 MMHG | HEIGHT: 66 IN | SYSTOLIC BLOOD PRESSURE: 154 MMHG | OXYGEN SATURATION: 100 % | BODY MASS INDEX: 23.04 KG/M2

## 2021-08-03 DIAGNOSIS — C50.411 MALIGNANT NEOPLASM OF UPPER-OUTER QUADRANT OF RIGHT BREAST IN FEMALE, ESTROGEN RECEPTOR POSITIVE (HCC): Primary | ICD-10-CM

## 2021-08-03 DIAGNOSIS — Z79.810 LONG-TERM CURRENT USE OF TAMOXIFEN: ICD-10-CM

## 2021-08-03 DIAGNOSIS — Z17.0 MALIGNANT NEOPLASM OF UPPER-OUTER QUADRANT OF RIGHT BREAST IN FEMALE, ESTROGEN RECEPTOR POSITIVE (HCC): Primary | ICD-10-CM

## 2021-08-03 PROCEDURE — 99214 OFFICE O/P EST MOD 30 MIN: CPT | Performed by: INTERNAL MEDICINE

## 2021-08-03 RX ORDER — TAMOXIFEN CITRATE 20 MG/1
20 TABLET ORAL DAILY
Qty: 90 TABLET | Refills: 3 | Status: SHIPPED | OUTPATIENT
Start: 2021-08-03 | End: 2021-11-01

## 2021-08-03 NOTE — PROGRESS NOTES
Cancer Center Progress Note    Patient Name: Mar Nguyen   YOB: 1975   Medical Record Number: [id number]   Attending Physician: Ofelia Pierce M.D.      Chief Complaint:  Breast Cancer        Oncology History:  40year old with r oriented x 3, not in acute distress. HEENT: EOMs intact. Oropharynx is clear. Neck: No JVD, ROM intact. Breast: b/l mastectomies, expanders in place, no seroma or lesions. Chest: Symmetric expansion, nonlabored breathing  Abdomen: Soft, non tender.

## 2021-09-02 ENCOUNTER — TELEPHONE (OUTPATIENT)
Dept: SURGERY | Facility: CLINIC | Age: 46
End: 2021-09-02

## 2021-09-02 DIAGNOSIS — C50.511 MALIGNANT NEOPLASM OF LOWER-OUTER QUADRANT OF RIGHT BREAST OF FEMALE, ESTROGEN RECEPTOR POSITIVE (HCC): ICD-10-CM

## 2021-09-02 DIAGNOSIS — C50.911 MALIGNANT NEOPLASM OF RIGHT FEMALE BREAST, UNSPECIFIED ESTROGEN RECEPTOR STATUS, UNSPECIFIED SITE OF BREAST (HCC): Primary | ICD-10-CM

## 2021-09-02 DIAGNOSIS — Z17.0 MALIGNANT NEOPLASM OF LOWER-OUTER QUADRANT OF RIGHT BREAST OF FEMALE, ESTROGEN RECEPTOR POSITIVE (HCC): ICD-10-CM

## 2021-09-02 NOTE — TELEPHONE ENCOUNTER
Patient was called and asked to move her surgery for a cancer Patient. Patient agreed and her new surgery date is 10/1/2021. I thanked the patient for being so understanding.

## 2021-09-10 DIAGNOSIS — Z90.13 ABSENCE OF BOTH BREASTS: Primary | ICD-10-CM

## 2021-09-20 ENCOUNTER — PATIENT MESSAGE (OUTPATIENT)
Dept: HEMATOLOGY/ONCOLOGY | Facility: HOSPITAL | Age: 46
End: 2021-09-20

## 2021-09-20 ENCOUNTER — TELEPHONE (OUTPATIENT)
Dept: HEMATOLOGY/ONCOLOGY | Facility: HOSPITAL | Age: 46
End: 2021-09-20

## 2021-09-20 NOTE — TELEPHONE ENCOUNTER
09/20: SW alerted by the pt via [employer] messaging of her upcoming procedure and need for new FMLA/Leave of Absence paperwork. Pt states she will be in the [facility] tomorrow 09/21 and intends to bring the paperwork at that appointment.  SW will review,

## 2021-09-21 ENCOUNTER — OFFICE VISIT (OUTPATIENT)
Dept: HEMATOLOGY/ONCOLOGY | Facility: HOSPITAL | Age: 46
End: 2021-09-21
Attending: NURSE PRACTITIONER
Payer: COMMERCIAL

## 2021-09-21 DIAGNOSIS — Z85.3 PERSONAL HISTORY OF BREAST CANCER: Primary | ICD-10-CM

## 2021-09-21 DIAGNOSIS — Z71.9 COUNSELING, UNSPECIFIED: ICD-10-CM

## 2021-09-21 DIAGNOSIS — Z08 ENCOUNTER FOR FOLLOW-UP EXAMINATION AFTER COMPLETED TREATMENT FOR MALIGNANT NEOPLASM: ICD-10-CM

## 2021-09-21 PROCEDURE — 99215 OFFICE O/P EST HI 40 MIN: CPT | Performed by: NURSE PRACTITIONER

## 2021-09-21 NOTE — PROGRESS NOTES
I met with Marlene Chris for a Survivorship Clinic visit to provide a survivorship care plan (SCP) and information related to post-treatment care. She has a diagnosis of Stage IA, ER+, RI+, HER2- right lobular breast cancer.   She had a bilateral n follow-up. She is due to see Dr. Apple Rajan in Oct/Nov and has called for an appointment. She had been referred for Genetic Counseling here, due to strong family history, but prefers to get this done next year when she goes to [place].       Reviewed included the following survivorship resources:   -SCP and patient letter  -Breast Survivorship [facility] in [place] pandemic, virtual programming available and includes: education, support groups (breast, age 31-43), special programs,

## 2021-09-28 ENCOUNTER — LAB ENCOUNTER (OUTPATIENT)
Dept: LAB | Age: 46
End: 2021-09-28
Attending: SURGERY
Payer: COMMERCIAL

## 2021-09-28 DIAGNOSIS — Z01.818 PRE-OP TESTING: ICD-10-CM

## 2021-09-29 RX ORDER — OFLOXACIN 3 MG/ML
SOLUTION AURICULAR (OTIC) DAILY
COMMUNITY
End: 2021-10-22 | Stop reason: ALTCHOICE

## 2021-10-01 ENCOUNTER — HOSPITAL ENCOUNTER (OUTPATIENT)
Facility: HOSPITAL | Age: 46
Setting detail: HOSPITAL OUTPATIENT SURGERY
Discharge: HOME | End: 2021-10-01
Attending: SURGERY | Admitting: SURGERY
Payer: COMMERCIAL

## 2021-10-01 ENCOUNTER — ANESTHESIA (OUTPATIENT)
Dept: SURGERY | Facility: HOSPITAL | Age: 46
End: 2021-10-01
Payer: COMMERCIAL

## 2021-10-01 ENCOUNTER — ANESTHESIA EVENT (OUTPATIENT)
Dept: SURGERY | Facility: HOSPITAL | Age: 46
End: 2021-10-01
Payer: COMMERCIAL

## 2021-10-01 VITALS
RESPIRATION RATE: 16 BRPM | TEMPERATURE: 98 F | HEART RATE: 82 BPM | WEIGHT: 140 LBS | OXYGEN SATURATION: 100 % | SYSTOLIC BLOOD PRESSURE: 127 MMHG | DIASTOLIC BLOOD PRESSURE: 72 MMHG | HEIGHT: 66 IN | BODY MASS INDEX: 22.5 KG/M2

## 2021-10-01 DIAGNOSIS — Z90.13 ABSENCE OF BOTH BREASTS: ICD-10-CM

## 2021-10-01 DIAGNOSIS — Z01.818 PRE-OP TESTING: Primary | ICD-10-CM

## 2021-10-01 PROCEDURE — 0HPU0NZ REMOVAL OF TISSUE EXPANDER FROM LEFT BREAST, OPEN APPROACH: ICD-10-PCS | Performed by: SURGERY

## 2021-10-01 PROCEDURE — 0HRV0JZ REPLACEMENT OF BILATERAL BREAST WITH SYNTHETIC SUBSTITUTE, OPEN APPROACH: ICD-10-PCS | Performed by: SURGERY

## 2021-10-01 PROCEDURE — 0HPT0NZ REMOVAL OF TISSUE EXPANDER FROM RIGHT BREAST, OPEN APPROACH: ICD-10-PCS | Performed by: SURGERY

## 2021-10-01 PROCEDURE — 88300 SURGICAL PATH GROSS: CPT | Performed by: SURGERY

## 2021-10-01 PROCEDURE — 81025 URINE PREGNANCY TEST: CPT

## 2021-10-01 PROCEDURE — 0HRV37Z REPLACEMENT OF BILATERAL BREAST WITH AUTOLOGOUS TISSUE SUBSTITUTE, PERCUTANEOUS APPROACH: ICD-10-PCS | Performed by: SURGERY

## 2021-10-01 DEVICE — IMPLANTABLE DEVICE: Type: IMPLANTABLE DEVICE | Site: BREAST | Status: FUNCTIONAL

## 2021-10-01 RX ORDER — HYDROMORPHONE HYDROCHLORIDE 1 MG/ML
0.6 INJECTION, SOLUTION INTRAMUSCULAR; INTRAVENOUS; SUBCUTANEOUS EVERY 5 MIN PRN
Status: DISCONTINUED | OUTPATIENT
Start: 2021-10-01 | End: 2021-10-01

## 2021-10-01 RX ORDER — CEPHALEXIN 500 MG/1
500 CAPSULE ORAL 4 TIMES DAILY
Qty: 20 CAPSULE | Refills: 0 | Status: SHIPPED | OUTPATIENT
Start: 2021-10-01 | End: 2021-10-22 | Stop reason: ALTCHOICE

## 2021-10-01 RX ORDER — MORPHINE SULFATE 10 MG/ML
6 INJECTION, SOLUTION INTRAMUSCULAR; INTRAVENOUS EVERY 10 MIN PRN
Status: DISCONTINUED | OUTPATIENT
Start: 2021-10-01 | End: 2021-10-01

## 2021-10-01 RX ORDER — MIDAZOLAM HYDROCHLORIDE 1 MG/ML
INJECTION INTRAMUSCULAR; INTRAVENOUS AS NEEDED
Status: DISCONTINUED | OUTPATIENT
Start: 2021-10-01 | End: 2021-10-01 | Stop reason: SURG

## 2021-10-01 RX ORDER — HALOPERIDOL 5 MG/ML
0.25 INJECTION INTRAMUSCULAR ONCE AS NEEDED
Status: DISCONTINUED | OUTPATIENT
Start: 2021-10-01 | End: 2021-10-01

## 2021-10-01 RX ORDER — ONDANSETRON 2 MG/ML
INJECTION INTRAMUSCULAR; INTRAVENOUS AS NEEDED
Status: DISCONTINUED | OUTPATIENT
Start: 2021-10-01 | End: 2021-10-01 | Stop reason: SURG

## 2021-10-01 RX ORDER — HYDROCODONE BITARTRATE AND ACETAMINOPHEN 5; 325 MG/1; MG/1
1-2 TABLET ORAL EVERY 4 HOURS PRN
Qty: 20 TABLET | Refills: 0 | Status: SHIPPED | OUTPATIENT
Start: 2021-10-01 | End: 2021-10-22 | Stop reason: ALTCHOICE

## 2021-10-01 RX ORDER — MORPHINE SULFATE 4 MG/ML
4 INJECTION, SOLUTION INTRAMUSCULAR; INTRAVENOUS EVERY 10 MIN PRN
Status: DISCONTINUED | OUTPATIENT
Start: 2021-10-01 | End: 2021-10-01

## 2021-10-01 RX ORDER — HYDROCODONE BITARTRATE AND ACETAMINOPHEN 5; 325 MG/1; MG/1
1 TABLET ORAL AS NEEDED
Status: DISCONTINUED | OUTPATIENT
Start: 2021-10-01 | End: 2021-10-01

## 2021-10-01 RX ORDER — DOCUSATE SODIUM 100 MG/1
100 CAPSULE, LIQUID FILLED ORAL 2 TIMES DAILY
Qty: 30 CAPSULE | Refills: 1 | Status: SHIPPED | OUTPATIENT
Start: 2021-10-01 | End: 2021-10-22 | Stop reason: ALTCHOICE

## 2021-10-01 RX ORDER — HYDROMORPHONE HYDROCHLORIDE 1 MG/ML
0.4 INJECTION, SOLUTION INTRAMUSCULAR; INTRAVENOUS; SUBCUTANEOUS EVERY 5 MIN PRN
Status: DISCONTINUED | OUTPATIENT
Start: 2021-10-01 | End: 2021-10-01

## 2021-10-01 RX ORDER — LIDOCAINE HYDROCHLORIDE 10 MG/ML
INJECTION, SOLUTION EPIDURAL; INFILTRATION; INTRACAUDAL; PERINEURAL AS NEEDED
Status: DISCONTINUED | OUTPATIENT
Start: 2021-10-01 | End: 2021-10-01 | Stop reason: SURG

## 2021-10-01 RX ORDER — ONDANSETRON 2 MG/ML
INJECTION INTRAMUSCULAR; INTRAVENOUS AS NEEDED
Status: DISCONTINUED | OUTPATIENT
Start: 2021-10-01 | End: 2021-10-01

## 2021-10-01 RX ORDER — NALOXONE HYDROCHLORIDE 0.4 MG/ML
80 INJECTION, SOLUTION INTRAMUSCULAR; INTRAVENOUS; SUBCUTANEOUS AS NEEDED
Status: DISCONTINUED | OUTPATIENT
Start: 2021-10-01 | End: 2021-10-01

## 2021-10-01 RX ORDER — ACETAMINOPHEN 500 MG
1000 TABLET ORAL ONCE
Status: COMPLETED | OUTPATIENT
Start: 2021-10-01 | End: 2021-10-01

## 2021-10-01 RX ORDER — BUPIVACAINE HYDROCHLORIDE 5 MG/ML
INJECTION, SOLUTION EPIDURAL; INTRACAUDAL AS NEEDED
Status: DISCONTINUED | OUTPATIENT
Start: 2021-10-01 | End: 2021-10-01 | Stop reason: HOSPADM

## 2021-10-01 RX ORDER — CEFAZOLIN SODIUM/WATER 2 G/20 ML
2 SYRINGE (ML) INTRAVENOUS ONCE
Status: COMPLETED | OUTPATIENT
Start: 2021-10-01 | End: 2021-10-01

## 2021-10-01 RX ORDER — ONDANSETRON 4 MG/1
4 TABLET, FILM COATED ORAL EVERY 8 HOURS PRN
Qty: 12 TABLET | Refills: 0 | Status: SHIPPED | OUTPATIENT
Start: 2021-10-01 | End: 2021-10-22 | Stop reason: ALTCHOICE

## 2021-10-01 RX ORDER — PHENYLEPHRINE HCL 10 MG/ML
VIAL (ML) INJECTION AS NEEDED
Status: DISCONTINUED | OUTPATIENT
Start: 2021-10-01 | End: 2021-10-01 | Stop reason: SURG

## 2021-10-01 RX ORDER — HYDROCODONE BITARTRATE AND ACETAMINOPHEN 5; 325 MG/1; MG/1
2 TABLET ORAL AS NEEDED
Status: DISCONTINUED | OUTPATIENT
Start: 2021-10-01 | End: 2021-10-01

## 2021-10-01 RX ORDER — ONDANSETRON 2 MG/ML
4 INJECTION INTRAMUSCULAR; INTRAVENOUS ONCE AS NEEDED
Status: DISCONTINUED | OUTPATIENT
Start: 2021-10-01 | End: 2021-10-01

## 2021-10-01 RX ORDER — DEXAMETHASONE SODIUM PHOSPHATE 4 MG/ML
VIAL (ML) INJECTION AS NEEDED
Status: DISCONTINUED | OUTPATIENT
Start: 2021-10-01 | End: 2021-10-01 | Stop reason: SURG

## 2021-10-01 RX ORDER — SODIUM CHLORIDE, SODIUM LACTATE, POTASSIUM CHLORIDE, CALCIUM CHLORIDE 600; 310; 30; 20 MG/100ML; MG/100ML; MG/100ML; MG/100ML
INJECTION, SOLUTION INTRAVENOUS CONTINUOUS
Status: DISCONTINUED | OUTPATIENT
Start: 2021-10-01 | End: 2021-10-01

## 2021-10-01 RX ORDER — HYDROMORPHONE HYDROCHLORIDE 1 MG/ML
0.2 INJECTION, SOLUTION INTRAMUSCULAR; INTRAVENOUS; SUBCUTANEOUS EVERY 5 MIN PRN
Status: DISCONTINUED | OUTPATIENT
Start: 2021-10-01 | End: 2021-10-01

## 2021-10-01 RX ORDER — MORPHINE SULFATE 4 MG/ML
2 INJECTION, SOLUTION INTRAMUSCULAR; INTRAVENOUS EVERY 10 MIN PRN
Status: DISCONTINUED | OUTPATIENT
Start: 2021-10-01 | End: 2021-10-01

## 2021-10-01 RX ORDER — ROCURONIUM BROMIDE 10 MG/ML
INJECTION, SOLUTION INTRAVENOUS AS NEEDED
Status: DISCONTINUED | OUTPATIENT
Start: 2021-10-01 | End: 2021-10-01 | Stop reason: SURG

## 2021-10-01 RX ORDER — PROCHLORPERAZINE EDISYLATE 5 MG/ML
5 INJECTION INTRAMUSCULAR; INTRAVENOUS ONCE AS NEEDED
Status: DISCONTINUED | OUTPATIENT
Start: 2021-10-01 | End: 2021-10-01

## 2021-10-01 RX ADMIN — PHENYLEPHRINE HCL 50 MCG: 10 MG/ML VIAL (ML) INJECTION at 09:14:00

## 2021-10-01 RX ADMIN — LIDOCAINE HYDROCHLORIDE 50 MG: 10 INJECTION, SOLUTION EPIDURAL; INFILTRATION; INTRACAUDAL; PERINEURAL at 08:41:00

## 2021-10-01 RX ADMIN — PHENYLEPHRINE HCL 50 MCG: 10 MG/ML VIAL (ML) INJECTION at 08:55:00

## 2021-10-01 RX ADMIN — SODIUM CHLORIDE, SODIUM LACTATE, POTASSIUM CHLORIDE, CALCIUM CHLORIDE: 600; 310; 30; 20 INJECTION, SOLUTION INTRAVENOUS at 10:19:00

## 2021-10-01 RX ADMIN — ROCURONIUM BROMIDE 50 MG: 10 INJECTION, SOLUTION INTRAVENOUS at 08:41:00

## 2021-10-01 RX ADMIN — DEXAMETHASONE SODIUM PHOSPHATE 4 MG: 4 MG/ML VIAL (ML) INJECTION at 08:41:00

## 2021-10-01 RX ADMIN — CEFAZOLIN SODIUM/WATER 2 G: 2 G/20 ML SYRINGE (ML) INTRAVENOUS at 08:55:00

## 2021-10-01 RX ADMIN — PHENYLEPHRINE HCL 100 MCG: 10 MG/ML VIAL (ML) INJECTION at 10:08:00

## 2021-10-01 RX ADMIN — PHENYLEPHRINE HCL 50 MCG: 10 MG/ML VIAL (ML) INJECTION at 09:22:00

## 2021-10-01 RX ADMIN — ONDANSETRON 4 MG: 2 INJECTION INTRAMUSCULAR; INTRAVENOUS at 10:35:00

## 2021-10-01 RX ADMIN — MIDAZOLAM HYDROCHLORIDE 2 MG: 1 INJECTION INTRAMUSCULAR; INTRAVENOUS at 08:39:00

## 2021-10-01 NOTE — ANESTHESIA PROCEDURE NOTES
Airway  Date/Time: 10/1/2021 8:44 AM  Urgency: Elective    Airway not difficult    General Information and Staff    Patient location during procedure: OR  Anesthesiologist: Isabel Solano MD  Resident/CRNA: Peter Collado CRNA  Performed: CRNA     [place]

## 2021-10-01 NOTE — ANESTHESIA POSTPROCEDURE EVALUATION
Patient: Shonna Cuadra    Procedure Summary     Date: 10/01/21 Room / Location: [facility] OR 04 / [facility] OR    Anesthesia Start: 0906 Anesthesia Stop: 1107    Procedure: Removal of the bilateral breast tissue expanders and placement of perma

## 2021-10-01 NOTE — BRIEF OP NOTE
Pre-Operative Diagnosis: Absence of both breasts [Z90.13]     Post-Operative Diagnosis: Absence of both breasts [Z90.13]      Procedure Performed:   Removal of the bilateral breast tissue expanders and placement of permanent silicone implants.  Autologous f

## 2021-10-01 NOTE — ANESTHESIA PREPROCEDURE EVALUATION
Anesthesia PreOp Note    HPI:     Elroy Erickson is a 40year old female who presents for preoperative consultation requested by: Elias Martino MD    Date of Surgery: 10/1/2021    Procedure(s):  Removal of the bilateral breast tissue expander medications on file.       No Known Allergies    Family History   Problem Relation Age of Onset   • Colon Cancer Father 59   • Hypertension Father    • Diabetes Father    • Breast Cancer Paternal Cousin 32   • Breast Cancer Paternal Cousin 45   • Diabetes M Not on file      Active Member of Clubs or Organizations: Not on file      Attends Club or Organization Meetings: Not on file      Marital Status: Not on file  Intimate Partner Violence:       Fear of Current or Ex-Partner: Not on file      Emotionally [place]

## 2021-10-02 NOTE — OPERATIVE REPORT
[facility]    PATIENT'S NAME: Earlineha Nyhan, ELZBIETA L   ATTENDING PHYSICIAN: Alonso Spencer MD   OPERATING PHYSICIAN: Alonso Spencer MD   PATIENT ACCOUNT#:   [id number]    LOCATION:  [address]  MEDICAL RECORD #:   [id number] The arms were placed abducted on padded arm boards and loosely secured with Kerlix. The chest and abdomen were prepped and draped sterilely.   Bilateral inframammary fold incisions as well as the proposed liposuction port sites at the superior umbilical derrek interrupted 3-0 Vicryl deep dermal sutures. Next, the capsule was released superiorly and medially along the base with electrocautery. Sizers were placed, and the patient was placed in the upright position.   Some inferolateral displacement of the pocket

## 2021-10-08 ENCOUNTER — OFFICE VISIT (OUTPATIENT)
Dept: SURGERY | Facility: CLINIC | Age: 46
End: 2021-10-08
Payer: COMMERCIAL

## 2021-10-08 DIAGNOSIS — Z90.13 ABSENCE OF BOTH BREASTS: Primary | ICD-10-CM

## 2021-10-08 PROCEDURE — 99024 POSTOP FOLLOW-UP VISIT: CPT | Performed by: PHYSICIAN ASSISTANT

## 2021-10-08 NOTE — PROGRESS NOTES
Mini Crane is a 40year old female who presents today for a follow-up after removal of bilateral breast tissue expanders, left breast capsulorrhaphy, placement of silicone gel implants bilateral breasts, autologous fat grafting to [facility]

## 2021-10-22 ENCOUNTER — OFFICE VISIT (OUTPATIENT)
Dept: SURGERY | Facility: CLINIC | Age: 46
End: 2021-10-22
Payer: COMMERCIAL

## 2021-10-22 DIAGNOSIS — Z90.13 ABSENCE OF BOTH BREASTS: Primary | ICD-10-CM

## 2021-10-22 PROCEDURE — 99024 POSTOP FOLLOW-UP VISIT: CPT | Performed by: SURGERY

## 2021-10-22 NOTE — PROGRESS NOTES
Mony Silva is a 40year old female who presents today for a follow-up. She denies fever and chills. She denies nausea, vomiting, diarrhea or constipation.        Physical Examination:  Breasts: Bilateral breast and abdominal incisions are

## 2021-11-04 ENCOUNTER — APPOINTMENT (OUTPATIENT)
Dept: HEMATOLOGY/ONCOLOGY | Facility: HOSPITAL | Age: 46
End: 2021-11-04
Attending: INTERNAL MEDICINE
Payer: COMMERCIAL

## 2021-11-11 ENCOUNTER — OFFICE VISIT (OUTPATIENT)
Dept: HEMATOLOGY/ONCOLOGY | Facility: HOSPITAL | Age: 46
End: 2021-11-11
Attending: INTERNAL MEDICINE
Payer: COMMERCIAL

## 2021-11-11 VITALS
BODY MASS INDEX: 23.3 KG/M2 | DIASTOLIC BLOOD PRESSURE: 83 MMHG | HEART RATE: 103 BPM | SYSTOLIC BLOOD PRESSURE: 169 MMHG | HEIGHT: 66 IN | WEIGHT: 145 LBS | TEMPERATURE: 98 F | OXYGEN SATURATION: 99 % | RESPIRATION RATE: 16 BRPM

## 2021-11-11 DIAGNOSIS — Z17.0 MALIGNANT NEOPLASM OF UPPER-OUTER QUADRANT OF RIGHT BREAST IN FEMALE, ESTROGEN RECEPTOR POSITIVE (HCC): ICD-10-CM

## 2021-11-11 DIAGNOSIS — C50.411 MALIGNANT NEOPLASM OF UPPER-OUTER QUADRANT OF RIGHT BREAST IN FEMALE, ESTROGEN RECEPTOR POSITIVE (HCC): ICD-10-CM

## 2021-11-11 DIAGNOSIS — Z79.810 LONG-TERM CURRENT USE OF TAMOXIFEN: Primary | ICD-10-CM

## 2021-11-11 PROCEDURE — 99214 OFFICE O/P EST MOD 30 MIN: CPT | Performed by: INTERNAL MEDICINE

## 2021-11-11 RX ORDER — TAMOXIFEN CITRATE 20 MG/1
20 TABLET ORAL DAILY
Qty: 90 TABLET | Refills: 3 | Status: SHIPPED | OUTPATIENT
Start: 2021-11-11

## 2021-11-11 NOTE — PROGRESS NOTES
Cancer Center Progress Note    Patient Name: Zaira Arshad   YOB: 1975   Medical Record Number: [id number]   Attending Physician: Firman Cockayne, M.D.      Chief Complaint:  Breast Cancer        Oncology History:  40year old with r Examination:  Performance Status:  General: Patient is alert and oriented x 3, not in acute distress. HEENT: EOMs intact. Oropharynx is clear. Neck: No JVD, ROM intact.    Breast: b/l mastectomies with implants in place, normal exam otherwise, normal axi

## 2021-12-14 ENCOUNTER — TELEPHONE (OUTPATIENT)
Dept: SURGERY | Facility: CLINIC | Age: 46
End: 2021-12-14

## 2021-12-14 NOTE — TELEPHONE ENCOUNTER
Spoke to patients , Agustin Piper, who received a letter stating that the SA used for Elisa's surgery on 10/1/21 was not in network. Hari Bee, , was notified and they will be in contact with how to resolve this issue.

## 2021-12-30 ENCOUNTER — TELEPHONE (OUTPATIENT)
Dept: SURGERY | Facility: CLINIC | Age: 46
End: 2021-12-30

## 2021-12-30 NOTE — TELEPHONE ENCOUNTER
Patient  Lani Beal called and LVM that he has not received any updates from [address] about his wife case. I have sent a message to [facility] to please reach out to the patient.

## 2022-01-06 ENCOUNTER — TELEPHONE (OUTPATIENT)
Dept: SURGERY | Facility: CLINIC | Age: 47
End: 2022-01-06

## 2022-01-06 NOTE — TELEPHONE ENCOUNTER
Received a message regarding the my chart message that I sent to the patient in response to the billing issue. Patient asked that I call her  Anne-Marie Gasca and I did call his cell.  Pt's  was extremely frustrated with the bill he received for a surgica

## 2022-01-19 ENCOUNTER — TELEPHONE (OUTPATIENT)
Dept: SURGERY | Facility: CLINIC | Age: 47
End: 2022-01-19

## 2022-01-19 NOTE — TELEPHONE ENCOUNTER
Patient  called and LVM that he would like to speak only to Dr. Cristian Proctor in regards to his wife surgery bill. I have sent an email to Windy Rogers our supervisor and Dr. Cristian Proctor asking them to return the patient call.

## 2022-08-24 ENCOUNTER — OFFICE VISIT (OUTPATIENT)
Dept: HEMATOLOGY/ONCOLOGY | Facility: HOSPITAL | Age: 47
End: 2022-08-24
Attending: INTERNAL MEDICINE
Payer: COMMERCIAL

## 2022-08-24 VITALS
HEART RATE: 98 BPM | RESPIRATION RATE: 16 BRPM | WEIGHT: 147 LBS | OXYGEN SATURATION: 99 % | TEMPERATURE: 99 F | HEIGHT: 66 IN | SYSTOLIC BLOOD PRESSURE: 133 MMHG | DIASTOLIC BLOOD PRESSURE: 81 MMHG | BODY MASS INDEX: 23.63 KG/M2

## 2022-08-24 DIAGNOSIS — Z79.810 LONG-TERM CURRENT USE OF TAMOXIFEN: ICD-10-CM

## 2022-08-24 DIAGNOSIS — Z17.0 MALIGNANT NEOPLASM OF UPPER-OUTER QUADRANT OF RIGHT BREAST IN FEMALE, ESTROGEN RECEPTOR POSITIVE (HCC): Primary | ICD-10-CM

## 2022-08-24 DIAGNOSIS — Z90.13 ABSENCE OF BOTH BREASTS: ICD-10-CM

## 2022-08-24 DIAGNOSIS — C50.411 MALIGNANT NEOPLASM OF UPPER-OUTER QUADRANT OF RIGHT BREAST IN FEMALE, ESTROGEN RECEPTOR POSITIVE (HCC): Primary | ICD-10-CM

## 2022-08-24 PROCEDURE — 99214 OFFICE O/P EST MOD 30 MIN: CPT | Performed by: INTERNAL MEDICINE

## 2022-08-24 RX ORDER — AMLODIPINE BESYLATE 2.5 MG/1
2.5 TABLET ORAL DAILY
COMMUNITY
Start: 2022-08-09

## 2022-08-24 RX ORDER — TAMOXIFEN CITRATE 20 MG/1
20 TABLET ORAL DAILY
Qty: 90 TABLET | Refills: 3 | Status: SHIPPED | OUTPATIENT
Start: 2022-08-24

## 2023-02-14 ENCOUNTER — OFFICE VISIT (OUTPATIENT)
Dept: OBGYN CLINIC | Facility: CLINIC | Age: 48
End: 2023-02-14

## 2023-02-14 VITALS
DIASTOLIC BLOOD PRESSURE: 84 MMHG | WEIGHT: 147.19 LBS | BODY MASS INDEX: 24 KG/M2 | HEART RATE: 97 BPM | SYSTOLIC BLOOD PRESSURE: 147 MMHG

## 2023-02-14 DIAGNOSIS — Z01.419 ENCOUNTER FOR WELL WOMAN EXAM WITH ROUTINE GYNECOLOGICAL EXAM: Primary | ICD-10-CM

## 2023-02-14 DIAGNOSIS — Z12.4 SCREENING FOR MALIGNANT NEOPLASM OF CERVIX: ICD-10-CM

## 2023-02-14 DIAGNOSIS — R10.2 PELVIC PAIN: ICD-10-CM

## 2023-02-14 PROCEDURE — 99386 PREV VISIT NEW AGE 40-64: CPT | Performed by: NURSE PRACTITIONER

## 2023-02-14 PROCEDURE — 3079F DIAST BP 80-89 MM HG: CPT | Performed by: NURSE PRACTITIONER

## 2023-02-14 PROCEDURE — 3077F SYST BP >= 140 MM HG: CPT | Performed by: NURSE PRACTITIONER

## 2023-02-15 LAB — HPV I/H RISK 1 DNA SPEC QL NAA+PROBE: NEGATIVE

## 2023-03-13 ENCOUNTER — TELEPHONE (OUTPATIENT)
Dept: OBGYN CLINIC | Facility: CLINIC | Age: 48
End: 2023-03-13

## 2023-03-13 NOTE — TELEPHONE ENCOUNTER
Incoming fax received from [place]. Pelvic ultrasound report left on [address] [place] [address] 24E desk.

## 2023-03-22 ENCOUNTER — OFFICE VISIT (OUTPATIENT)
Dept: FAMILY MEDICINE CLINIC | Facility: CLINIC | Age: 48
End: 2023-03-22
Payer: COMMERCIAL

## 2023-03-22 VITALS
HEIGHT: 63.75 IN | HEART RATE: 88 BPM | WEIGHT: 146 LBS | TEMPERATURE: 98 F | DIASTOLIC BLOOD PRESSURE: 90 MMHG | SYSTOLIC BLOOD PRESSURE: 132 MMHG | BODY MASS INDEX: 25.23 KG/M2

## 2023-03-22 DIAGNOSIS — Z76.89 ENCOUNTER TO ESTABLISH CARE: Primary | ICD-10-CM

## 2023-03-22 DIAGNOSIS — Z85.3 HISTORY OF BREAST CANCER: ICD-10-CM

## 2023-03-22 DIAGNOSIS — Z12.11 SCREEN FOR COLON CANCER: ICD-10-CM

## 2023-03-22 DIAGNOSIS — I10 PRIMARY HYPERTENSION: ICD-10-CM

## 2023-03-22 DIAGNOSIS — E55.9 VITAMIN D DEFICIENCY: ICD-10-CM

## 2023-03-22 DIAGNOSIS — Z00.00 ROUTINE MEDICAL EXAM: ICD-10-CM

## 2023-03-22 PROCEDURE — 3008F BODY MASS INDEX DOCD: CPT | Performed by: FAMILY MEDICINE

## 2023-03-22 PROCEDURE — 3075F SYST BP GE 130 - 139MM HG: CPT | Performed by: FAMILY MEDICINE

## 2023-03-22 PROCEDURE — 3080F DIAST BP >= 90 MM HG: CPT | Performed by: FAMILY MEDICINE

## 2023-03-22 PROCEDURE — 99386 PREV VISIT NEW AGE 40-64: CPT | Performed by: FAMILY MEDICINE

## 2023-03-22 RX ORDER — AMLODIPINE BESYLATE 2.5 MG/1
2.5 TABLET ORAL DAILY
Qty: 90 TABLET | Refills: 4 | Status: SHIPPED | OUTPATIENT
Start: 2023-03-22

## 2023-08-02 ENCOUNTER — TELEPHONE (OUTPATIENT)
Dept: GASTROENTEROLOGY | Facility: CLINIC | Age: 48
End: 2023-08-02

## 2023-08-02 ENCOUNTER — OFFICE VISIT (OUTPATIENT)
Dept: GASTROENTEROLOGY | Facility: CLINIC | Age: 48
End: 2023-08-02

## 2023-08-02 VITALS
DIASTOLIC BLOOD PRESSURE: 94 MMHG | HEIGHT: 63.75 IN | HEART RATE: 92 BPM | WEIGHT: 142 LBS | SYSTOLIC BLOOD PRESSURE: 153 MMHG | BODY MASS INDEX: 24.54 KG/M2

## 2023-08-02 DIAGNOSIS — Z12.11 ENCOUNTER FOR SCREENING COLONOSCOPY: ICD-10-CM

## 2023-08-02 DIAGNOSIS — Z12.11 COLON CANCER SCREENING: Primary | ICD-10-CM

## 2023-08-02 DIAGNOSIS — Z80.0 FAMILY HISTORY OF COLON CANCER: Primary | ICD-10-CM

## 2023-08-02 PROCEDURE — 3008F BODY MASS INDEX DOCD: CPT | Performed by: INTERNAL MEDICINE

## 2023-08-02 PROCEDURE — 3077F SYST BP >= 140 MM HG: CPT | Performed by: INTERNAL MEDICINE

## 2023-08-02 PROCEDURE — 3080F DIAST BP >= 90 MM HG: CPT | Performed by: INTERNAL MEDICINE

## 2023-08-02 PROCEDURE — S0285 CNSLT BEFORE SCREEN COLONOSC: HCPCS | Performed by: INTERNAL MEDICINE

## 2023-08-02 RX ORDER — POLYETHYLENE GLYCOL 3350, SODIUM CHLORIDE, SODIUM BICARBONATE, POTASSIUM CHLORIDE 420; 11.2; 5.72; 1.48 G/4L; G/4L; G/4L; G/4L
POWDER, FOR SOLUTION ORAL
Qty: 1 EACH | Refills: 0 | Status: SHIPPED | OUTPATIENT
Start: 2023-08-02

## 2023-08-02 NOTE — TELEPHONE ENCOUNTER
Scheduled for:  Colonoscopy [address] , [facility]   Provider Name:  Moni Alvarez  Date:  11/14/23  Location: [facility]  Sedation:  Mac   Time: 08:45 Am  (pt is aware that [facility] will call the day before to confirm arrival time)     Prep:  Trilyte  Prep instructions were given to pt in the office, I discuss prep Instructions with patient at the time of the appointment which she verbally understood and given the prep instructions at the time of the appointment  Meds/Allergies Reconciled?:  Physician reviewed     Diagnosis with codes:  Colon cancer screening Z12.11  Was patient informed to call insurance with codes (Y/N):  Yes, I confirmed [address] [place] [facility]"" insurance with the patient. The patient also verbally understands to call her insurance to check for pre-cert, codes were given on prep instructions. Referral sent?:  Referral was sent at the time of electronic surgical scheduling. [facility] or [address] [place] [address] notified?: Electronic case request was sent to [facility] and received a confirmation today     Medication Orders: This patient verbally confirmed that she is not taking:   Iron, blood thinners, BP meds, and is not diabetic   Not latex allergy, Not PCN allergy and does not have a pacemaker Pt is aware to NOT take iron pills, herbal meds and diet supplements for 7 days before exam. Also to NOT take any form of alcohol, recreational drugs and any forms of ED meds 24 hours before exam.    Misc Orders:  Patient verbally understood & will await a phone call from [facility] to schedule. Further instructions given by staff: Patient was informed about the new cancellation policy for his/her procedure. Patient was also given a copy of the cancellation policy at the time of the appointment and verbalized understanding.

## 2023-08-02 NOTE — PATIENT INSTRUCTIONS
# family history of colon CA and screening    Recommend:  -Schedule colonoscopy w/ IV or MAC screening high risk  -Prep: Split dose Colyte or equivalent    ** If MAC @ [facility]:    - NO alcohol, recreational drugs nor erectile dysfunction mediations 72 hours before procedure(s)   - NO herbal supplements or weight loss medications x 7 days prior to the procedure(s)    ** If MAC @ [facility] or IV twilight - continue all medications as prescribed     Read all bowel prep instructions carefully     AVOID seeds, nuts, popcorn, raw fruits and vegetables (cooked is okay) for 2-3 days before procedure      >>>Please note: if you were prescribed Suprep for the bowel prep and it is too expensive or not covered by insurance, it is okay to substitute Trilyte (or any similar generic prep). This can be done by notifying the pharmacy or calling our office.

## 2023-10-12 ENCOUNTER — OFFICE VISIT (OUTPATIENT)
Dept: HEMATOLOGY/ONCOLOGY | Facility: HOSPITAL | Age: 48
End: 2023-10-12
Attending: SPECIALIST
Payer: COMMERCIAL

## 2023-10-12 VITALS
WEIGHT: 139.81 LBS | DIASTOLIC BLOOD PRESSURE: 84 MMHG | TEMPERATURE: 98 F | OXYGEN SATURATION: 99 % | SYSTOLIC BLOOD PRESSURE: 139 MMHG | BODY MASS INDEX: 24 KG/M2 | HEART RATE: 94 BPM

## 2023-10-12 DIAGNOSIS — Z79.810 LONG-TERM CURRENT USE OF TAMOXIFEN: ICD-10-CM

## 2023-10-12 DIAGNOSIS — Z17.0 MALIGNANT NEOPLASM OF UPPER-OUTER QUADRANT OF RIGHT BREAST IN FEMALE, ESTROGEN RECEPTOR POSITIVE: Primary | ICD-10-CM

## 2023-10-12 DIAGNOSIS — C50.411 MALIGNANT NEOPLASM OF UPPER-OUTER QUADRANT OF RIGHT BREAST IN FEMALE, ESTROGEN RECEPTOR POSITIVE: Primary | ICD-10-CM

## 2023-10-12 PROCEDURE — 99214 OFFICE O/P EST MOD 30 MIN: CPT | Performed by: SPECIALIST

## 2023-10-12 RX ORDER — TAMOXIFEN CITRATE 20 MG/1
20 TABLET ORAL DAILY
Qty: 90 TABLET | Refills: 1 | Status: SHIPPED | OUTPATIENT
Start: 2023-10-12

## 2023-10-12 NOTE — PROGRESS NOTES
Patient is here for MD visit for Breast cancer. Hx of bilateral mastectomy. Transferring care from Dr Kacie Shankar. Patient is on Tamoxifen daily. Occasional mild hot flash but manageable. Patient reports intermittent pain and pruritus in both breasts. No redness or swelling.        Education Record    Learner:  Patient    Disease / Diagnosis:  Breast cancer     Barriers / Limitations:  None   Comments:    Method:  Discussion   Comments:    General Topics:  Plan of care reviewed   Comments:    Outcome:  Shows understanding   Comments:

## 2023-10-26 ENCOUNTER — NURSE ONLY (OUTPATIENT)
Dept: HEMATOLOGY/ONCOLOGY | Facility: HOSPITAL | Age: 48
End: 2023-10-26
Attending: GENETIC COUNSELOR, MS

## 2023-10-26 ENCOUNTER — GENETICS ENCOUNTER (OUTPATIENT)
Dept: GENETICS | Facility: HOSPITAL | Age: 48
End: 2023-10-26
Attending: GENETIC COUNSELOR, MS

## 2023-10-26 DIAGNOSIS — Z85.3 PERSONAL HISTORY OF MALIGNANT NEOPLASM OF BREAST: Primary | ICD-10-CM

## 2023-10-26 DIAGNOSIS — Z80.0 FAMILY HISTORY OF MALIGNANT NEOPLASM OF GASTROINTESTINAL TRACT: ICD-10-CM

## 2023-10-26 DIAGNOSIS — Z80.3 FAMILY HISTORY OF MALIGNANT NEOPLASM OF BREAST: ICD-10-CM

## 2023-10-26 PROCEDURE — 96040 HC GENETIC COUNSELING EA 30 MIN: CPT | Performed by: GENETIC COUNSELOR, MS

## 2023-10-26 PROCEDURE — 36415 COLL VENOUS BLD VENIPUNCTURE: CPT

## 2023-11-15 ENCOUNTER — TELEPHONE (OUTPATIENT)
Dept: GENETICS | Facility: HOSPITAL | Age: 48
End: 2023-11-15

## 2023-11-15 NOTE — TELEPHONE ENCOUNTER
Shared genetic testing results with Sergio Wallis over phone, which are negative for any known mutations in 80 gene panel +RNA through [facility] ([facility] Multi-Cancer Panel+RNA) aside from 2 VUS, one in MUTYH (c.713A>G) and other in RET (c.451A>G), neither of which will change her clinical management. Released results to her through lab's portal and will mail her a copy of these results. All of her questions were answered to the best of my ability and she was reassured by the results and appreciative of the call.

## 2023-11-21 ENCOUNTER — TELEPHONE (OUTPATIENT)
Facility: CLINIC | Age: 48
End: 2023-11-21

## 2023-11-21 NOTE — TELEPHONE ENCOUNTER
Recall colon in 3 years per Dr. Sudhir Mary. Colonoscopy done on 11/14/23. Health maintenance updated and message sent to pt outreach to repeat colon in 3 years. MyChart message from MD read by pt on 11/19/23.

## 2023-11-21 NOTE — TELEPHONE ENCOUNTER
----- Message from Maryann Austin MD sent at 11/18/2023  5:22 PM CST -----  GI staff: please place recall for colonoscopy in 3 years

## 2023-12-30 NOTE — H&P
History of Present Illness: The patient is a 40year old female with a history of bilateral mastectomy and tissue expander reconstruction who now presents for exchange of permanent implants.     Past Medical History:      Past Medical History:   Diagnosis throat, or swollen glands    Respiratory:   The patient denies shortness of breath, cough, bloody cough, phlegm, asthma, or wheezing    Cardiovascular:   The patient denies chest pain/pressure, palpitations, irregular heartbeat, high blood pressure, stroke, 18   Ht 1.676 m (5' 6\")   Wt 63.5 kg (140 lb)   LMP 09/13/2021   SpO2 100%   BMI 22.60 kg/m²     The patient is awake, alert, and oriented. She is a well-nourished, well-developed female who appears her stated age.  Her speech patterns and movements are n abnormalities, cysts or calcifications requiring biopsy, and need for further surgery. We reviewed the expected postoperative course including possible need for drains, as well as need for activity limitation and compression.   Multiple questions were answe Admission

## 2024-02-05 ENCOUNTER — PATIENT MESSAGE (OUTPATIENT)
Dept: FAMILY MEDICINE CLINIC | Facility: CLINIC | Age: 49
End: 2024-02-05

## 2024-02-06 NOTE — TELEPHONE ENCOUNTER
Appointment Information   Name: Elisa Del Angel MRN: [id number]   Date: 2/5/2024 Status: Can   Appt Time: 11:00 AM Length: 30   Visit Type: MYCHART PHYSICAL [2963] Copay: $0.00   Provider: Malgorzata Thompson MD Department: [facility]   Referral Number:   Referral Status:     Enc Form Number: [id number]       Notes: Annual check up   Made On:  Canceled:  Rmv FollowUp: 10/12/2023 7:50 PM  2/5/2024 1:07 PM  2/5/2024 2:52 PM By:  By:  By: GENERIC, MYCHART [MYCHARTG] (Pt Web)  JUANA HAMILTON [[id number]] (ES)  URI CONTRERAS [[id number]] (ES)   Cancel Rsn: Patient - Personal (sent MyChart to cancel appointment)   Remove Follow?up Rsn: Cancel/Reschedule

## 2024-03-14 ENCOUNTER — OFFICE VISIT (OUTPATIENT)
Dept: OBGYN CLINIC | Facility: CLINIC | Age: 49
End: 2024-03-14
Payer: COMMERCIAL

## 2024-03-14 VITALS
SYSTOLIC BLOOD PRESSURE: 128 MMHG | WEIGHT: 132.81 LBS | HEART RATE: 85 BPM | DIASTOLIC BLOOD PRESSURE: 81 MMHG | BODY MASS INDEX: 21 KG/M2

## 2024-03-14 DIAGNOSIS — N95.1 PERIMENOPAUSAL SYMPTOMS: ICD-10-CM

## 2024-03-14 DIAGNOSIS — Z85.3 HISTORY OF BREAST CANCER IN FEMALE: ICD-10-CM

## 2024-03-14 DIAGNOSIS — Z12.4 SCREENING FOR CERVICAL CANCER: ICD-10-CM

## 2024-03-14 DIAGNOSIS — Z01.419 WELL WOMAN EXAM WITH ROUTINE GYNECOLOGICAL EXAM: Primary | ICD-10-CM

## 2024-03-14 DIAGNOSIS — Z79.810 CARE RELATED TO CURRENT TAMOXIFEN USE: ICD-10-CM

## 2024-03-14 PROCEDURE — 99396 PREV VISIT EST AGE 40-64: CPT | Performed by: NURSE PRACTITIONER

## 2024-03-14 NOTE — PROGRESS NOTES
[facility]    Obstetrics and Gynecology    Chief Complaint   Patient presents with    Physical     annual       Elisa GUTIERREZ Elijah Boucher is a 48 year old female No obstetric history on file. Patient's last menstrual period was 10/01/2023 (exact date). presenting for annual gynecology exam.  New patient to me  Hx of breast cancer right breast, on tamoxifen.  She elected for bilateral mastectomy in 2021 and reconstruction. Has bilateral implants, silicone.  She sees oncology every 6 months. She is taking tamoxifen.    No period since October. Had 3 periods prior to that.    She has hot flashes with tamoxifen. She is not bothered by these.    She is sexually active with her , no pain or bleeding with intercourse, no abnormal discharge. No pelvic pain or bloating.  Negative genetic testing.    She desires pap today and pelvic US due to her history of breast cancer and family history.    Pap:2/14/2023 NILM, neg HPV  No history abnormal pap  Contraception:none  Mammo: n/a - see oncology  Colonoscopy: 11/2023 repeat in 3 years.    OBSTETRICS HISTORY:  OB History   No obstetric history on file.       GYNE HISTORY:  Period Cycle (Days): irregular pt reports last period OCtober 2023 (3/14/2024  3:02 PM)  Use of Birth Control (if yes, specify type): None (3/14/2024  3:02 PM)  Hx Prior Abnormal Pap: No (3/14/2024  3:02 PM)  Pap Date: 02/14/23 (3/14/2024  3:02 PM)  Pap Result Notes: PAP NEG HPV NEG (3/14/2024  3:02 PM)      History   Sexual Activity    Sexual activity: Not on file           Latest Ref Rng & Units 2/14/2023    11:49 AM   RECENT PAP RESULTS   Thinprep Pap Negative for intraepithelial lesion or malignancy Negative for intraepithelial lesion or malignancy    HPV Negative Negative          MEDICAL HISTORY:  Past Medical History:   Diagnosis Date    Cancer (HCC)     rt breast     Past Surgical History:   Procedure Laterality Date    BREAST BIOPSY Right 2021    COLONOSCOPY      COLONOSCOPY N/A  11/14/2023    Procedure: COLONOSCOPY;  Surgeon: Brenda Mayorga MD;  Location: [facility] MAIN OR    MASTECTOMY LEFT      MASTECTOMY RIGHT      OTHER SURGICAL HISTORY  Breast       SOCIAL HISTORY:  Social History     Socioeconomic History    Marital status:      Spouse name: Not on file    Number of children: Not on file    Years of education: Not on file    Highest education level: Not on file   Occupational History    Not on file   Tobacco Use    Smoking status: Never    Smokeless tobacco: Never   Vaping Use    Vaping Use: Never used   Substance and Sexual Activity    Alcohol use: Yes     Alcohol/week: 4.0 standard drinks of alcohol     Types: 1 Glasses of wine, 3 Standard drinks or equivalent per week     Comment: Socially    Drug use: Never    Sexual activity: Not on file   Other Topics Concern    Caffeine Concern Not Asked    Exercise Not Asked    Seat Belt Not Asked    Special Diet Not Asked    Stress Concern Not Asked    Weight Concern Not Asked   Social History Narrative    Not on file     Social Determinants of Health     Financial Resource Strain: Not on file   Food Insecurity: Not on file   Transportation Needs: Not on file   Physical Activity: Not on file   Stress: Not on file   Social Connections: Not on file   Housing Stability: Not on file         Depression Screening (PHQ-2/PHQ-9): Over the LAST 2 WEEKS   Little interest or pleasure in doing things (over the last two weeks)?: Not at all    Feeling down, depressed, or hopeless (over the last two weeks)?: Not at all    PHQ-2 SCORE: 0           FAMILY HISTORY:  Family History   Problem Relation Age of Onset    Colon Cancer Father 62    Hypertension Father     Diabetes Father     Diabetes Mother     Cancer Paternal Cousin Male 47        gastric; smoker, alcohol use    Breast Cancer Paternal Cousin Female 31    Breast Cancer Paternal Cousin Female 37    Cancer Maternal Uncle         gastric ca       MEDICATIONS:    Current Outpatient Medications:      tamoxifen 20 MG Oral Tab, Take 1 tablet (20 mg total) by mouth daily., Disp: 90 tablet, Rfl: 1    amLODIPine 2.5 MG Oral Tab, Take 1 tablet (2.5 mg total) by mouth daily., Disp: 90 tablet, Rfl: 4    FISH OIL-KRILL OIL OR, Take 2,000 Units by mouth daily.  , Disp: , Rfl:     Multiple Vitamin (MULTI-VITAMIN DAILY) Oral Tab, Take by mouth., Disp: , Rfl:     ALLERGIES:  No Known Allergies      Review of Systems:  Constitutional:  Denies fatigue, night sweats, hot flashes  Eyes:  denies blurred or double vision  Cardiovascular:  denies chest pain or palpitations  Respiratory:  denies shortness of breath  Gastrointestinal:  denies heartburn, abdominal pain, diarrhea or constipation  Genitourinary:  denies dysuria, incontinence, abnormal vaginal discharge, vaginal itching,   Musculoskeletal:  denies back pain   Skin/Breast:  Denies any breast pain, lumps, or discharge.   Neurological:  denies headaches, extremity weakness or numbness.  Psychiatric: denies depression or anxiety.  Endocrine:   denies excessive thirst or urination.  Heme/Lymph:  denies history of anemia, easy bruising or bleeding.      PHYSICAL EXAM:     Vitals:    03/14/24 1504   BP: 128/81   Pulse: 85   Weight: 132 lb 12.8 oz (60.2 kg)       Body mass index is 21.43 kg/m².     Constitutional: well developed, well nourished  Psychiatric:  Oriented to time, place, person and situation. Appropriate mood and affect  Head/Face: normocephalic  Neck/Thyroid: thyroid symmetric, no thyromegaly, no nodules, no adenopathy  Lymphatic:no abnormal supraclavicular or axillary adenopathy is noted  Breast: normal without palpable masses, tenderness, asymmetry, nipple discharge, nipple retraction or skin changes  Abdomen:  soft, nontender, nondistended, no masses  Skin/Hair: no unusual rashes or bruises  Extremities: no edema, no cyanosis    Pelvic Exam:  External Genitalia: normal appearance, hair distribution, and no lesions  Urethral Meatus:  normal in size, location,  without lesions and prolapse  Bladder:  No fullness, masses or tenderness  Vagina:  Normal appearance without lesions, no abnormal discharge  Cervix:  Normal without tenderness on motion  Uterus: normal in size, contour, position, mobility, without tenderness  Adnexa: normal without masses or tenderness  Perineum: normal  Anus: no hemorroids     Assessment & Plan:    ICD-10-CM    1. Well woman exam with routine gynecological exam  Z01.419       2. Screening for cervical cancer  Z12.4 ThinPrep PAP Smear     Hpv Dna  High Risk , Thin Prep Collect      3. History of breast cancer in female  Z85.3 US PELVIS W EV (CPT=76856/45908)      4. Care related to current tamoxifen use  Z79.810 US PELVIS W EV (CPT=76856/49342)      5. Perimenopausal symptoms  N95.1          Reviewed ASCCP guidelines with the patient   Pap done today per patient request  Contraception: none  Perimenopausal - rev'd to call if aub, +vasomotor symptoms but states not bothersome, knows can follow up if desires treatment and there are non hormonal options available to her.  Hx of breast cancer and on tamoxifen - following with oncology.  Desires pelvic US  Breast Health:     Reviewed current guidelines with the patient and to start Mammograms at age 40  Reviewed monthly self breast exams with the patient   Discussed diet, exercise, MVIs with Ca/Vit D  Follow up in 1 yr for ELIZABETH Maldonado    This note was prepared using Dragon Medical voice recognition dictation software. As a result errors may occur. When identified these errors have been corrected. While every attempt is made to correct errors during dictation discrepancies may still exist.

## 2024-03-15 LAB — HPV I/H RISK 1 DNA SPEC QL NAA+PROBE: NEGATIVE

## 2024-04-15 ENCOUNTER — APPOINTMENT (OUTPATIENT)
Dept: HEMATOLOGY/ONCOLOGY | Facility: HOSPITAL | Age: 49
End: 2024-04-15
Attending: SPECIALIST
Payer: COMMERCIAL

## 2024-05-21 RX ORDER — TAMOXIFEN CITRATE 20 MG/1
20 TABLET ORAL DAILY
Qty: 90 TABLET | Refills: 0 | Status: SHIPPED | OUTPATIENT
Start: 2024-05-21 | End: 2024-07-08

## 2024-06-10 ENCOUNTER — APPOINTMENT (OUTPATIENT)
Dept: HEMATOLOGY/ONCOLOGY | Facility: HOSPITAL | Age: 49
End: 2024-06-10
Attending: SPECIALIST
Payer: COMMERCIAL

## 2024-07-04 NOTE — PROGRESS NOTES
[facility] Progress Note      Patient Name: Elisa Del Angel   YOB: 1975  Medical Record Number: [id number]  Attending Physician: Javier Bennett M.D.     The 21st Century Cures Act makes medical notes like these available to patients in the interest of transparency. Please be advised this is a medical document. Medical documents are intended to carry relevant information, facts as evident, and the clinical opinion of the practitioner. The medical note is intended as peer to peer communication and may appear blunt or direct. It is written in medical language and may contain abbreviations or verbiage that are unfamiliar.      Date of Visit: 7/8/2024       Chief Complaint  Invasive lobular carcinoma, right breast - follow up.    Oncologic History  Elisa Boucher is a 49 year old female who on 04/15/2021 underwent right mastectomy with sentinel lymph node biopsy for breast cancer and prophylactic left mastectomy. Pathology from the left breast showed benign findings. Pathology from the right breast showed grade 2, 1.2 cm invasive lobular carcinoma, negative surgical margins, 1 lymph node negative for metastatic disease (0/1). Immunohistochemical studies were as follows: estrogen receptor 100% positive, progesterone receptor 100%, Her2 1+. She was staged as [id number].     OncotypeDX score was 8 and chemotherapy was not pursued. She did not undergo germline genetic testing at diagnosis.     She was premenopausal at diagnosis.     In 05/2021 she began adjuvant endocrine therapy with tamoxifen.    On 10/04/2021 she underwent implant breast reconstruction.     Patient transferred care to me in 10/2023 after the departure of Dr. Koo. Patient did not follow up with Dr. Koo as recommended in 02/2023.     Patient next followed up with me on 10/12/2023.     She underwent germline genetic testing which showed no pathologic mutations in an 84 gene panel  through [facility]. Two VUSs were identified: MUTYH (c.713A>G) and RET (c.451A>G).    History of Present Illness  Patient returns for scheduled follow up. No vaginal bleeding (patient hasn't had a period for more than 1 year but was having periods before the start of tamoxifen). No self palpated masses or lymph nodes. She has no new respiratory symptoms. She denies any mood changes.     Performance Status   Karnofsky 100% - Normal, no complaints.    Past Medical History (historical data, reviewed by physician)   Invasive lobular carcinoma, right breast (as above);     Past Surgical History (historical data, reviewed by physician)   Bilateral mastectomy and right sentinel lymph node biopsy (as above); bilateral implant breast reconstruction;     Family History (historical data, reviewed by physician)   Father with colorectal cancer age 64; paternal cousin with breast cancer age 31; paternal counsin with breast cancer age 38.    Social History (historical data, reviewed by physician)   Denies tobacco use.       Current Medications   tamoxifen 20 MG Oral Tab Take 1 tablet (20 mg total) by mouth daily. 90 tablet 1    amLODIPine 2.5 MG Oral Tab Take 1 tablet (2.5 mg total) by mouth daily. 90 tablet 4    FISH OIL-KRILL OIL OR Take 2,000 Units by mouth daily.        Multiple Vitamin (MULTI-VITAMIN DAILY) Oral Tab Take by mouth.       Allergies   Ms. Elijah Boucher has No Known Allergies.     Vital Signs   /79 (BP Location: Right arm, Patient Position: Sitting, Cuff Size: adult)   Pulse 88   Temp 97.9 °F (36.6 °C) (Oral)   Resp 16   Ht 1.676 m (5' 6\")   Wt 60.9 kg (134 lb 3.2 oz)   LMP 10/01/2023 (Exact Date)   SpO2 100%   BMI 21.66 kg/m²     Physical Examination   Constitutional      Well developed, well nourished. Appears close to chronological age. No apparent distress.   Head                   Normocephalic and atraumatic.  Eyes                   Conjunctiva clear; sclera anicteric.  ENMT                  External nose normal; external ears normal.  Neck                   Supple, without masses.  Hematologic/Lymphatic No cervical, supraclavicular, axillary lymphadenopathy.  Respiratory          Normal effort; no respiratory distress; lungs clear to auscultation bilaterally.  Cardiovascular     Regular rate and rhythm; normal S1S2.  Abdomen            Non-tender; non-distended; no masses,no fluid wave; no hepatosplenomegaly.  Extremities          No lower extremity edema.  Integumentary      No obvious rashes.   Neurologic           Motor and sensory grossly intact.  Psychiatric          Mood and affect appropriate.    Laboratory   No results found for this or any previous visit (from the past 48 hour(s)).    Impression and Plan   1.   Invasive lobular carcinoma, right breast: Patient is staged as Z2bV3E9. Tumor was estrogen and progesterone receptor positive and Her2 1+. She has undergone bilateral mastectomy and sentinel lymph node biopsy. OncotypeDX score was 8 and chemotherapy was not pursued. In 05/2021 she began adjuvant endocrine therapy with tamoxifen.           Continue tamoxifen without modification. Patient is reminded to see gynecology annually. I also recommend that she follow up with plastic surgery. Discussed the option of checking labs and switching to aromatase inhibitor but together we decided against. Patient had early stage breast cancer with a low Oncotype DX. The expected incremental improvement in survival of AI over tamoxifen is expected to be very small.    Patient will continue to receive longitudinal care by me for the complex care required for the cancer diagnosis including the expected complications related to anticancer therapy.     Planned Follow Up   Patient will return for follow up in 6 months.    Electronically signed by:    Javier Bennett M.D.  System Medical Director of Oncology Services  [facility]

## 2024-07-08 ENCOUNTER — OFFICE VISIT (OUTPATIENT)
Dept: HEMATOLOGY/ONCOLOGY | Facility: HOSPITAL | Age: 49
End: 2024-07-08
Attending: SPECIALIST
Payer: COMMERCIAL

## 2024-07-08 VITALS
DIASTOLIC BLOOD PRESSURE: 79 MMHG | BODY MASS INDEX: 21.57 KG/M2 | TEMPERATURE: 98 F | OXYGEN SATURATION: 100 % | HEART RATE: 88 BPM | SYSTOLIC BLOOD PRESSURE: 119 MMHG | RESPIRATION RATE: 16 BRPM | HEIGHT: 66 IN | WEIGHT: 134.19 LBS

## 2024-07-08 DIAGNOSIS — Z17.0 MALIGNANT NEOPLASM OF UPPER-OUTER QUADRANT OF RIGHT BREAST IN FEMALE, ESTROGEN RECEPTOR POSITIVE (HCC): Primary | ICD-10-CM

## 2024-07-08 DIAGNOSIS — Z79.810 LONG-TERM CURRENT USE OF TAMOXIFEN: ICD-10-CM

## 2024-07-08 DIAGNOSIS — C50.411 MALIGNANT NEOPLASM OF UPPER-OUTER QUADRANT OF RIGHT BREAST IN FEMALE, ESTROGEN RECEPTOR POSITIVE (HCC): Primary | ICD-10-CM

## 2024-07-08 PROCEDURE — 99214 OFFICE O/P EST MOD 30 MIN: CPT | Performed by: SPECIALIST

## 2024-07-08 PROCEDURE — G2211 COMPLEX E/M VISIT ADD ON: HCPCS | Performed by: SPECIALIST

## 2024-07-08 RX ORDER — TAMOXIFEN CITRATE 20 MG/1
20 TABLET ORAL DAILY
Qty: 90 TABLET | Refills: 1 | Status: SHIPPED | OUTPATIENT
Start: 2024-07-08

## 2024-07-25 RX ORDER — AMLODIPINE BESYLATE 2.5 MG/1
2.5 TABLET ORAL DAILY
Qty: 90 TABLET | Refills: 0 | Status: SHIPPED | OUTPATIENT
Start: 2024-07-25

## 2024-07-25 NOTE — TELEPHONE ENCOUNTER
Please call patient to make an appointment and [employer] message sent,thank you.

## 2024-07-25 NOTE — TELEPHONE ENCOUNTER
This is a DR. Thompson patient/ not Dr Phipps patient.. Should go to her POD to address if she is not in office.

## 2024-07-25 NOTE — TELEPHONE ENCOUNTER
Please review; protocol failed/ has no protocol      Routing to pod mate as  is out of office     No active /future labs noted   Message sent for patient to make an appointment.     Requested Prescriptions   Pending Prescriptions Disp Refills    AMLODIPINE 2.5 MG Oral Tab [Pharmacy Med Name: Amlodipine Besylate 2.5 Mg Tab Asce] 90 tablet 0     Sig: TAKE ONE TABLET BY MOUTH ONE TIME DAILY       Hypertension Medications Protocol Failed - 7/22/2024  3:00 AM        Failed - CMP or BMP in past 12 months        Failed - In person appointment or virtual visit in the past 12 mos or appointment in next 3 mos     Recent Outpatient Visits              2 weeks ago Malignant neoplasm of upper-outer quadrant of right breast in female, estrogen receptor positive (HCC)    [facility] Hematology Oncology Javier Bennett MD    Office Visit    4 months ago Well woman exam with routine gynecological exam    [facility] - OB/GYN Shaila Harp APRN    Office Visit    9 months ago     [facility] Hematology Oncology    Nurse Only    9 months ago Malignant neoplasm of upper-outer quadrant of right breast in female, estrogen receptor positive (HCC)    [facility] [place] Javier Bennett MD    Office Visit    11 months ago Family history of colon cancer    [facility] Brenda Mayorga MD    Office Visit          Future Appointments         Provider Department Appt Notes    In 4 months Javier Bennett MD [facility] Hematology Oncology Follow-up 6m                    Failed - EGFRCR or GFRNAA > 50     GFR Evaluation            Passed - Last BP reading less than 140/90     BP Readings from Last 1 Encounters:   07/08/24 119/79                  Recent Outpatient Visits              2 weeks ago Malignant neoplasm of upper-outer quadrant of right breast in female, estrogen receptor positive  (HCC)    [facility] Hematology Oncology Javier Bennett MD    Office Visit    4 months ago Well woman exam with routine gynecological exam    [facility] - OB/GYN Shaila Harp APRN    Office Visit    9 months ago     [facility] Hematology Oncology    Nurse Only    9 months ago Malignant neoplasm of upper-outer quadrant of right breast in female, estrogen receptor positive (HCC)    [facility] [place] Javier Bennett MD    Office Visit    11 months ago Family history of colon cancer    [facility] Brenda Mayorga MD    Office Visit          Future Appointments         Provider Department Appt Notes    In 4 months Javier Bennett MD [facility] Hematology Oncology Follow-up 6m

## 2024-07-25 NOTE — TELEPHONE ENCOUNTER
Please review; protocol failed/ has no protocol      Routing to pod mates as  is out of office   Requested Prescriptions   Pending Prescriptions Disp Refills    amLODIPine 2.5 MG Oral Tab [Pharmacy Med Name: Amlodipine Besylate 2.5 Mg Tab Asce] 30 tablet 0     Sig: Take 1 tablet (2.5 mg total) by mouth daily.       Hypertension Medications Protocol Failed - 7/25/2024  8:31 AM        Failed - CMP or BMP in past 12 months        Failed - EGFRCR or GFRNAA > 50     GFR Evaluation            Passed - Last BP reading less than 140/90     BP Readings from Last 1 Encounters:   07/08/24 119/79               Passed - In person appointment or virtual visit in the past 12 mos or appointment in next 3 mos     Recent Outpatient Visits              2 weeks ago Malignant neoplasm of upper-outer quadrant of right breast in female, estrogen receptor positive (HCC)    [facility] Hematology Oncology Javier Bennett MD    Office Visit    4 months ago Well woman exam with routine gynecological exam    [facility] - OB/GYN Shaila Harp APRN    Office Visit    9 months ago     [facility] Hematology Oncology    Nurse Only    9 months ago Malignant neoplasm of upper-outer quadrant of right breast in female, estrogen receptor positive (HCC)    [facility] in [place] Javier Bennett MD    Office Visit    11 months ago Family history of colon cancer    [facility] Brenda Mayorga MD    Office Visit          Future Appointments         Provider Department Appt Notes    In 2 weeks Malgorzata Thompson MD [facility] f/u meds    In 4 months Javier Bennett MD [facility] Hematology Oncology Follow-up 6m                       Recent Outpatient Visits              2 weeks ago Malignant neoplasm of upper-outer quadrant of right breast in female, estrogen receptor  positive (HCC)    [facility] Hematology Oncology Javier Bennett MD    Office Visit    4 months ago Well woman exam with routine gynecological exam    [facility] - OB/GYN Shaila Harp APRN    Office Visit    9 months ago     [facility] Hematology Oncology    Nurse Only    9 months ago Malignant neoplasm of upper-outer quadrant of right breast in female, estrogen receptor positive (HCC)    [facility] in [place] Javier Bennett MD    Office Visit    11 months ago Family history of colon cancer    [facility] Brenda Mayorga MD    Office Visit          Future Appointments         Provider Department Appt Notes    In 2 weeks Malgorzata Thompson MD [facility] f/u meds    In 4 months Javier Bennett MD [facility] Hematology Oncology Follow-up 6m

## 2024-08-13 ENCOUNTER — OFFICE VISIT (OUTPATIENT)
Dept: FAMILY MEDICINE CLINIC | Facility: CLINIC | Age: 49
End: 2024-08-13
Payer: COMMERCIAL

## 2024-08-13 VITALS
HEART RATE: 91 BPM | SYSTOLIC BLOOD PRESSURE: 131 MMHG | DIASTOLIC BLOOD PRESSURE: 85 MMHG | HEIGHT: 66 IN | WEIGHT: 135.19 LBS | BODY MASS INDEX: 21.73 KG/M2

## 2024-08-13 DIAGNOSIS — Z85.3 HISTORY OF BREAST CANCER: ICD-10-CM

## 2024-08-13 DIAGNOSIS — Z00.00 ROUTINE MEDICAL EXAM: ICD-10-CM

## 2024-08-13 DIAGNOSIS — I10 PRIMARY HYPERTENSION: Primary | ICD-10-CM

## 2024-08-13 PROCEDURE — 99396 PREV VISIT EST AGE 40-64: CPT | Performed by: FAMILY MEDICINE

## 2024-08-13 RX ORDER — AMLODIPINE BESYLATE 2.5 MG/1
2.5 TABLET ORAL DAILY
Qty: 90 TABLET | Refills: 4 | Status: SHIPPED | OUTPATIENT
Start: 2024-08-13

## 2024-08-13 NOTE — PROGRESS NOTES
HPI:   Elisa Boucher is a 49 year old female who presents for a complete physical exam and BP check.    Has another 3 years of tamoxifen. Never needed chemo or radiation. - just had surgery. Exercising regularly. No chest pain or sob.     Wt Readings from Last 3 Encounters:   08/13/24 135 lb 3.2 oz (61.3 kg)   07/08/24 134 lb 3.2 oz (60.9 kg)   03/14/24 132 lb 12.8 oz (60.2 kg)     Body mass index is 21.82 kg/m².       Current Outpatient Medications   Medication Sig Dispense Refill    amLODIPine 2.5 MG Oral Tab Take 1 tablet (2.5 mg total) by mouth daily. 90 tablet 0    tamoxifen 20 MG Oral Tab Take 1 tablet (20 mg total) by mouth daily. 90 tablet 1    FISH OIL-KRILL OIL OR Take 2,000 Units by mouth daily.        Multiple Vitamin (MULTI-VITAMIN DAILY) Oral Tab Take by mouth.        Past Medical History:    Cancer (HCC)    rt breast      Past Surgical History:   Procedure Laterality Date    Breast biopsy Right 2021    Colonoscopy      Colonoscopy N/A 11/14/2023    Procedure: COLONOSCOPY;  Surgeon: Brenda Mayorga MD;  [facility]    Mastectomy left      Mastectomy right      Other surgical history  Breast      Family History   Problem Relation Age of Onset    Colon Cancer Father 62    Hypertension Father     Diabetes Father     Diabetes Mother     Cancer Paternal Cousin Male 47        gastric; smoker, alcohol use    Breast Cancer Paternal Cousin Female 31    Breast Cancer Paternal Cousin Female 37    Cancer Maternal Uncle         gastric ca      Social History:   Social History     Socioeconomic History    Marital status:    Tobacco Use    Smoking status: Never    Smokeless tobacco: Never   Vaping Use    Vaping status: Never Used   Substance and Sexual Activity    Alcohol use: Yes     Alcohol/week: 4.0 standard drinks of alcohol     Types: 1 Glasses of wine, 3 Standard drinks or equivalent per week     Comment: Socially    Drug use: Never          REVIEW OF SYSTEMS:   GENERAL: feels well  otherwise  Review of Systems   See HPI   EXAM:   /85   Pulse 91   Ht 5' 6\" (1.676 m)   Wt 135 lb 3.2 oz (61.3 kg)   LMP 10/01/2023 (Exact Date)   BMI 21.82 kg/m²     GENERAL: well developed, well nourished,in no apparent distress  SKIN: no rashes,no suspicious lesions  HEENT: atraumatic, normocephalic,ears and throat are clear  EYES:PERRLA, EOMI, conjunctiva are clear  LUNGS: clear to auscultation  CARDIO: RRR without murmur  GI: good BS's,no masses, HSM or tenderness  EXTREMITIES: no cyanosis, clubbing or edema  NEURO: Oriented times three,cranial nerves are intact,motor and sensory are grossly intact    ASSESSMENT AND PLAN:   Elisa Boucher is a 49 year old female who presents for a complete physical exam.    1. Primary hypertension  BP stable on medications.     2. Routine medical exam    - CBC With Differential With Platelet  - Comp Metabolic Panel (14)  - Lipid Panel  - TSH W Reflex To Free T4  - Vitamin B12  - Hemoglobin A1C  - VITAMIN D, SCREEN [[id number]][Q]       Malgorzata Thompson MD  8/13/2024  11:23 AM

## 2024-12-09 ENCOUNTER — OFFICE VISIT (OUTPATIENT)
Dept: HEMATOLOGY/ONCOLOGY | Facility: HOSPITAL | Age: 49
End: 2024-12-09
Attending: SPECIALIST
Payer: COMMERCIAL

## 2025-02-17 ENCOUNTER — APPOINTMENT (OUTPATIENT)
Age: 50
End: 2025-02-17
Attending: SPECIALIST
Payer: COMMERCIAL

## 2025-03-17 ENCOUNTER — TELEPHONE (OUTPATIENT)
Age: 50
End: 2025-03-17

## 2025-03-17 NOTE — TELEPHONE ENCOUNTER
Shared genetic testing results update with Kat. 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 Released results to her through lab's portal and will mail her a copy. All her questions were answered to the best of my ability and she was appreciative of the call and the update.

## 2025-04-07 RX ORDER — TAMOXIFEN CITRATE 20 MG/1
20 TABLET ORAL DAILY
Qty: 90 TABLET | Refills: 0 | OUTPATIENT
Start: 2025-04-07

## 2025-04-21 RX ORDER — TAMOXIFEN CITRATE 20 MG/1
20 TABLET ORAL DAILY
Qty: 90 TABLET | Refills: 0 | Status: SHIPPED | OUTPATIENT
Start: 2025-04-21

## 2025-05-10 NOTE — PROGRESS NOTES
[facility] Hematology Oncology Group Progress Note       Patient Name: Elisa Del Angel   YOB: 1975  Medical Record Number: [id number]  Attending Physician: Javier Bennett M.D.     The 21st Century Cures Act makes medical notes like these available to patients in the interest of transparency. Please be advised this is a medical document. Medical documents are intended to carry relevant information, facts as evident, and the clinical opinion of the practitioner. The medical note is intended as peer to peer communication and may appear blunt or direct. It is written in medical language and may contain abbreviations or verbiage that are unfamiliar.      Date of Visit: 5/12/2025       Chief Complaint  Invasive lobular carcinoma, right breast - follow up.    Oncologic History  Elisa Boucher is a 49 year old female who on 04/15/2021 underwent right mastectomy with sentinel lymph node biopsy for breast cancer and prophylactic left mastectomy. Pathology from the left breast showed benign findings. Pathology from the right breast showed grade 2, 1.2 cm invasive lobular carcinoma, negative surgical margins, 1 lymph node negative for metastatic disease (0/1). Immunohistochemical studies were as follows: estrogen receptor 100% positive, progesterone receptor 100%, Her2 1+. She was staged as [id number].     OncotypeDX score was 8 and chemotherapy was not pursued. She did not undergo germline genetic testing at diagnosis.     She was premenopausal at diagnosis.     In 05/2021 she began adjuvant endocrine therapy with tamoxifen.    On 10/04/2021 she underwent implant breast reconstruction.     Patient transferred care to me in 10/2023 after the departure of Dr. Koo. Patient did not follow up with Dr. Koo as recommended in 02/2023.     Patient next followed up with me on 10/12/2023.     She underwent germline genetic testing which showed no pathologic mutations in an 84 gene panel  through [facility]. Two VUSs were identified: MUTYH (c.713A>G) and RET (c.451A>G). Later the MUTYH mutation was upgraded to likely pathogenic.     History of Present Illness  Patient returns for follow up after failing to follow up in 12/2024 as recommended. No vaginal bleeding. No self palpated masses or lymph nodes. She has no new respiratory symptoms. She denies any mood changes.     Performance Status   Karnofsky 100% - Normal, no complaints.    Past Medical History (historical data, reviewed by physician)   Invasive lobular carcinoma, right breast (as above);     Past Surgical History (historical data, reviewed by physician)   Bilateral mastectomy and right sentinel lymph node biopsy (as above); bilateral implant breast reconstruction;     Family History (historical data, reviewed by physician)   Father with colorectal cancer age 64; paternal cousin with breast cancer age 31; paternal counsin with breast cancer age 38.    Social History (historical data, reviewed by physician)   Denies tobacco use.       Current Medications   TAMOXIFEN 20 MG Oral Tab TAKE ONE TABLET BY MOUTH ONE TIME DAILY 90 tablet 0    amLODIPine 2.5 MG Oral Tab Take 1 tablet (2.5 mg total) by mouth daily. 90 tablet 4    FISH OIL-KRILL OIL OR Take 2,000 Units by mouth in the morning.      Multiple Vitamin (MULTI-VITAMIN DAILY) Oral Tab Take by mouth.       Allergies   Ms. Elijah Boucher has no known allergies.     Vital Signs   /81 (BP Location: Left arm, Patient Position: Sitting, Cuff Size: adult)   Pulse 89   Temp 98 °F (36.7 °C) (Oral)   Resp 16   Ht 1.676 m (5' 6\")   Wt 66.2 kg (146 lb)   SpO2 100%   BMI 23.57 kg/m²     Physical Examination   Constitutional      Well developed, well nourished. No apparent distress.   Head                   Normocephalic and atraumatic.  Eyes                   Conjunctiva clear; sclera anicteric.  ENMT                 External nose normal; external ears normal.  Neck                   Supple,  without masses.  Hematologic/Lymphatic No cervical, supraclavicular, axillary lymphadenopathy.  Breasts   Bilateral breast reconstruction without suspicious masses.   Respiratory          Normal effort; no respiratory distress; lungs clear to auscultation bilaterally.  Cardiovascular     Regular rate and rhythm; normal S1S2.  Abdomen            Non-tender; non-distended; no masses,no fluid wave; no hepatosplenomegaly.  Extremities          No lower extremity edema.  Neurologic           Motor and sensory grossly intact.  Psychiatric          Mood and affect appropriate.    Laboratory   No results found for this or any previous visit (from the past 48 hours).    Impression and Plan   1.   Invasive lobular carcinoma, right breast: Patient is staged as [id number]. Tumor was estrogen and progesterone receptor positive and Her2 1+. She has undergone bilateral mastectomy and sentinel lymph node biopsy. OncotypeDX score was 8 and chemotherapy was not pursued. In 05/2021 she began adjuvant endocrine therapy with tamoxifen.           Continue tamoxifen without modification. Patient is reminded to see gynecology annually. Plan to complete 5 years of therapy.           Survivorship issues were addressed with the patient. No issues were identified by the patient.      2.   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     Planned Follow Up   Patient will return for follow up in 1 year.    Electronically Signed by:     Javier Bennett M.D.  System Medical Director, Oncology Services  [place] [facility]

## 2025-05-12 ENCOUNTER — OFFICE VISIT (OUTPATIENT)
Age: 50
End: 2025-05-12
Attending: SPECIALIST
Payer: COMMERCIAL

## 2025-05-12 VITALS
TEMPERATURE: 98 F | OXYGEN SATURATION: 100 % | RESPIRATION RATE: 16 BRPM | HEART RATE: 89 BPM | DIASTOLIC BLOOD PRESSURE: 81 MMHG | WEIGHT: 146 LBS | HEIGHT: 66 IN | BODY MASS INDEX: 23.46 KG/M2 | SYSTOLIC BLOOD PRESSURE: 149 MMHG

## 2025-05-12 DIAGNOSIS — Z08 ENCOUNTER FOR FOLLOW-UP SURVEILLANCE OF BREAST CANCER: ICD-10-CM

## 2025-05-12 DIAGNOSIS — Z17.0 MALIGNANT NEOPLASM OF UPPER-OUTER QUADRANT OF RIGHT BREAST IN FEMALE, ESTROGEN RECEPTOR POSITIVE (HCC): Primary | ICD-10-CM

## 2025-05-12 DIAGNOSIS — Z51.81 ENCOUNTER FOR MONITORING TAMOXIFEN THERAPY: ICD-10-CM

## 2025-05-12 DIAGNOSIS — C50.411 MALIGNANT NEOPLASM OF UPPER-OUTER QUADRANT OF RIGHT BREAST IN FEMALE, ESTROGEN RECEPTOR POSITIVE (HCC): Primary | ICD-10-CM

## 2025-05-12 DIAGNOSIS — Z79.810 LONG-TERM CURRENT USE OF TAMOXIFEN: ICD-10-CM

## 2025-05-12 DIAGNOSIS — Z79.810 ENCOUNTER FOR MONITORING TAMOXIFEN THERAPY: ICD-10-CM

## 2025-05-12 DIAGNOSIS — Z85.3 ENCOUNTER FOR FOLLOW-UP SURVEILLANCE OF BREAST CANCER: ICD-10-CM

## (undated) DEVICE — BLAKE SILICONE DRAIN, 15 FR ROUND, HUBLESS WITH 3/16" TROCAR: Brand: BLAKE

## (undated) DEVICE — SUTURE ETHILON 3-0 669H

## (undated) DEVICE — CHLORAPREP 26ML APPLICATOR

## (undated) DEVICE — SUTURE PDS II 4-0 PS-2

## (undated) DEVICE — SUTURE VICRYL 0 J340H

## (undated) DEVICE — CLIP MED INTNL HMCLP TNTLM

## (undated) DEVICE — GAMMEX® PI HYBRID SIZE 7.5, STERILE POWDER-FREE SURGICAL GLOVE, POLYISOPRENE AND NEOPRENE BLEND: Brand: GAMMEX

## (undated) DEVICE — 3M™ IOBAN™ 2 ANTIMICROBIAL INCISE DRAPE 6650EZ: Brand: IOBAN™ 2

## (undated) DEVICE — SUTURE CHROMIC GUT 5-0 P-3

## (undated) DEVICE — ENCORE® PERRY STYLE 42 PF SIZE 6.5, STERILE LATEX POWDER-FREE SURGICAL GLOVE: Brand: ENCORE

## (undated) DEVICE — MAJOR GENERAL: Brand: MEDLINE INDUSTRIES, INC.

## (undated) DEVICE — FLEXIBLE YANKAUER,MEDIUM TIP, NO VACUUM CONTROL: Brand: ARGYLE

## (undated) DEVICE — SYRINGE BULB 50/CS 48/PLT: Brand: MEDEGEN MEDICAL PRODUCTS, LLC

## (undated) DEVICE — Device: Brand: MICROAIRE®

## (undated) DEVICE — BANDAGE ROLL,100% COTTON, 6 PLY, LARGE: Brand: KERLIX

## (undated) DEVICE — SUTURE VICRYL 3-0 SH

## (undated) DEVICE — 3M™ STERI-STRIP™ REINFORCED ADHESIVE SKIN CLOSURES, R1548, 1 IN X 5 IN (25 MM X 125 MM), 4 STRIPS/ENVELOPE: Brand: 3M™ STERI-STRIP™

## (undated) DEVICE — NEEDLE 18G 1-1/2 BLUNT FILL

## (undated) DEVICE — SIZER BREAST IMPLANT FP 520CC

## (undated) DEVICE — PROXIMATE SKIN STAPLERS (35 WIDE) CONTAINS 35 STAINLESS STEEL STAPLES (FIXED HEAD): Brand: PROXIMATE

## (undated) DEVICE — SUTURE VICRYL 3-0 J497G

## (undated) DEVICE — BRA SURG ELIZ PINK M

## (undated) DEVICE — Device: Brand: MEDEX

## (undated) DEVICE — SUTURE SILK 2-0 FS

## (undated) DEVICE — 3M™ BAIR HUGGER® UNDERBODY BLANKET, FULL ACCESS, 10 PER CASE 63500: Brand: BAIR HUGGER™

## (undated) DEVICE — PEN: MARKING STD PT 100/CS: Brand: MEDICAL ACTION INDUSTRIES

## (undated) DEVICE — SUTURE MONOCRYL 4-0 PS-2

## (undated) DEVICE — DERMABOND LIQUID ADHESIVE

## (undated) DEVICE — SUTURE PDS II 3-0 SH

## (undated) DEVICE — SUTURE PDS II 2-0 CT-2

## (undated) DEVICE — SUCTION CANISTER, 3000CC,SAFELINER: Brand: DEROYAL

## (undated) DEVICE — BETADINE SOL 32 OZ 10% POVIDON

## (undated) DEVICE — DRESSING BIOPATCH 1X4 CNTR

## (undated) DEVICE — Device

## (undated) DEVICE — SOL  .9 1000ML BTL

## (undated) DEVICE — SUTURE PLAIN GUT 5-0 PC-1

## (undated) DEVICE — STANDARD HYPODERMIC NEEDLE,POLYPROPYLENE HUB: Brand: MONOJECT

## (undated) DEVICE — 1010 S-DRAPE TOWEL DRAPE 10/BX: Brand: STERI-DRAPE™

## (undated) DEVICE — SUPER SPONGES,MEDIUM: Brand: KERLIX

## (undated) DEVICE — CASED DISP BIPOLAR CORD

## (undated) DEVICE — COVER PRB NEOGUARD 30X2.6CM US

## (undated) DEVICE — TIP BOVIE 4\" MEGADYNE

## (undated) DEVICE — DRAIN RESERVOIR RELIAVAC 100CC

## (undated) DEVICE — SUTURE VICRYL 2-0 SH

## (undated) DEVICE — DRAPE PACK CHEST & U BAR

## (undated) DEVICE — ASPIRATION TUBING SET, DISPOSABLE: Brand: MICROAIRE®

## (undated) DEVICE — BRA SURG ELIZ PINK L

## (undated) DEVICE — 60 ML SYRINGE LUER-LOCK TIP: Brand: MONOJECT

## (undated) DEVICE — CAUTERY BLADE 2IN INS E1455

## (undated) DEVICE — DRAPE TAPE: Brand: CONVERTORS

## (undated) DEVICE — SPONGE LAP 18X18IN 7IN LOOP

## (undated) DEVICE — MINOR GENERAL: Brand: MEDLINE INDUSTRIES, INC.

## (undated) DEVICE — Device: Brand: JELCO

## (undated) DEVICE — NEEDLE HPO 18GA 1.5IN ECLPS

## (undated) DEVICE — CLIP SM INTNL HMCLP TNTLM ESCP

## (undated) DEVICE — TOWEL SURG OR 17X30IN BLUE

## (undated) DEVICE — CONTAINER SPEC STR 4OZ GRY LID

## (undated) DEVICE — 6 ML SYRINGE LUER-LOCK TIP: Brand: MONOJECT

## (undated) NOTE — LETTER
[facility]  Administration of Anesthesia  1.  Deborah Current, or _________________________________ acting on her behalf, (Patient) (Dependent/Representative) request to receive anesthesia for my pending procedure/operation/t block, spinal bleeding, seizure, cardiac arrest and death. 7. AWARENESS: I understand that it is possible (but unlikely) to have explicit memory of events from the operating room while under general anesthesia.   8. ELECTROCONVULSIVE THERAPY PATIENTS: This My signature below affirms that prior to the time of the procedure, I have explained to the patient and/or his/her guardian, the risks and benefits of undergoing anesthesia, as well as any reasonable alternatives.     _______________________________________

## (undated) NOTE — LETTER
[address] [place] [address]      Authorization for Surgical Operation and Procedure     Date:___________                                                                                                         Time:_______ products. The following are some, but not all, of the potential risks that can occur: fever and allergic reactions, hemolytic reactions, transmission of diseases such as Hepatitis, AIDS and Cytomegalovirus (CMV) and fluid overload.   In the event that I wi on my behalf). The surgeon or my attending physician will determine when the applicable recovery period ends for purposes of reinstating the DNAR order.   10. Patients having a sterilization procedure: I understand that if the procedure is successful the re a discussion with my patient.     _______________________________________________________________ _____________________________  Mabel Villaseñor)

## (undated) NOTE — MR AVS SNAPSHOT
After Visit Summary   3/14/2024    Elisa Boucher   MRN: [id number]           Visit Information     Date & Time  3/14/2024  2:00 PM Provider  Shaila Harp APRN Department  [facility] - OB/GYN Dept. Phone  [phone]      Your Vitals Were  Most recent update: 3/14/2024  3:06 PM    BP   128/81    Pulse   85    Wt   132 lb 12.8 oz    LMP   10/01/2023 (Exact Date)    BMI   21.43 kg/m²         Allergies as of 3/14/2024  Review status set to Review Complete on 3/14/2024   No Known Allergies     Your Current Medications        Dosage    tamoxifen 20 MG Oral Tab Take 1 tablet (20 mg total) by mouth daily.    amLODIPine 2.5 MG Oral Tab Take 1 tablet (2.5 mg total) by mouth daily.    FISH OIL-KRILL OIL OR Take 2,000 Units by mouth daily.      Multiple Vitamin (MULTI-VITAMIN DAILY) Oral Tab Take by mouth.      Diagnoses for This Visit    Well woman exam with routine gynecological exam   [606972]  -  Primary  Screening for cervical cancer   [398223]    History of breast cancer in female   [834948]    Care related to current tamoxifen use   [177813]    Perimenopausal symptoms   [771387]             We Ordered the Following     Normal Orders This Visit    Hpv Dna  High Risk , Thin Prep Collect [[id number] CUSTOM]     THINPREP PAP SMEAR ONLY [CPR0771 CUSTOM]     ThinPrep PAP Smear [[id number] CUSTOM]     Future Labs/Procedures Expected by Expires    Hpv Dna  High Risk , Thin Prep Collect [[id number] CUSTOM]  3/14/2024 3/14/2025    ThinPrep PAP Smear [[id number] CUSTOM]  3/14/2024 3/14/2025    US PELVIS W EV (CPT=76856/18067) [COMBO CPT(R)]  3/14/2024 (Approximate) 3/14/2025      Future Appointments        Provider Department    4/15/2024 1:00 PM Javier Bennett [facility] Hematology Oncology      Imaging Scheduling Instructions     Around March 14, 2024   Imaging:   US PELVIS W EV (CPT=76856/12328)    Instructions: To schedule an appointment for your radiology  test please call [employer] Central Scheduling at [phone].                    Did you know that [facility] primary [facility] physicians now offer Video Visits through [employer] for adult patients for a variety of conditions such as allergies, back pain and cold symptoms? Skip the drive and waiting room and online chat with a doctor face-to-face using your web-cam enabled computer or mobile device wherever you are. Video Visits cost $50 and can be paid hassle-free using a credit, debit, or health savings card.  Not active on [employer]? Ask us how to get signed up today!          If you receive a survey from [employer], please take a few minutes to complete it and provide feedback. We strive to deliver the best patient experience and are looking for ways to make improvements. Your feedback will help us do so. For more information on [employer], please visit [email or web address]           No text in SmartText           No text in SmartText

## (undated) NOTE — LETTER
[address] [place] [address]      Authorization for Surgical Operation and Procedure     Date:___________                                                                                                         Time:_______ as a result of receiving a blood transfusion and/or blood products.   The following are some, but not all, of the potential risks that can occur: fever and allergic reactions, hemolytic reactions, transmission of diseases such as Hepatitis, AIDS and Cytomeg explicitly stated by me (or a person authorized to consent on my behalf). The surgeon or my attending physician will determine when the applicable recovery period ends for purposes of reinstating the DNAR order.   10. Patients having a sterilization procedu used in this procedure/surgery, I have disclosed this and had a discussion with my patient.     _______________________________________________________________ _____________________________  Bisi Pill of Physician)

## (undated) NOTE — LETTER
[address]  [facility], [place], [place]  [address]  INFORMED CONSENT FOR TRANSFUSION OF BLOOD OR BLOOD PRODUCTS  My physician has informed me of the nature, purpose, benefits and risks of transfusion for blood and blood components that ______________________________________________  (Signature of Patient)                                                            (Responsible party in case of Minor,